# Patient Record
Sex: MALE | Race: WHITE | Employment: FULL TIME | URBAN - METROPOLITAN AREA
[De-identification: names, ages, dates, MRNs, and addresses within clinical notes are randomized per-mention and may not be internally consistent; named-entity substitution may affect disease eponyms.]

---

## 2019-09-19 ENCOUNTER — HOSPITAL ENCOUNTER (OUTPATIENT)
Dept: GENERAL RADIOLOGY | Age: 59
Discharge: HOME | End: 2019-09-19
Payer: COMMERCIAL

## 2019-09-19 ENCOUNTER — OFFICE VISIT (OUTPATIENT)
Dept: FAMILY MEDICINE CLINIC | Age: 59
End: 2019-09-19
Payer: COMMERCIAL

## 2019-09-19 VITALS
HEART RATE: 61 BPM | SYSTOLIC BLOOD PRESSURE: 112 MMHG | HEIGHT: 71 IN | WEIGHT: 177.8 LBS | DIASTOLIC BLOOD PRESSURE: 78 MMHG | BODY MASS INDEX: 24.89 KG/M2 | RESPIRATION RATE: 16 BRPM | OXYGEN SATURATION: 98 %

## 2019-09-19 DIAGNOSIS — M25.512 CHRONIC LEFT SHOULDER PAIN: ICD-10-CM

## 2019-09-19 DIAGNOSIS — E78.00 PURE HYPERCHOLESTEROLEMIA: ICD-10-CM

## 2019-09-19 DIAGNOSIS — G89.29 CHRONIC LEFT SHOULDER PAIN: ICD-10-CM

## 2019-09-19 DIAGNOSIS — Z11.59 NEED FOR HEPATITIS C SCREENING TEST: ICD-10-CM

## 2019-09-19 DIAGNOSIS — Z00.00 ANNUAL PHYSICAL EXAM: Primary | ICD-10-CM

## 2019-09-19 DIAGNOSIS — R53.83 FATIGUE, UNSPECIFIED TYPE: ICD-10-CM

## 2019-09-19 DIAGNOSIS — Z11.4 SCREENING FOR HIV (HUMAN IMMUNODEFICIENCY VIRUS): ICD-10-CM

## 2019-09-19 DIAGNOSIS — Z12.5 SCREENING FOR PROSTATE CANCER: ICD-10-CM

## 2019-09-19 DIAGNOSIS — D12.4 ADENOMATOUS POLYP OF DESCENDING COLON: ICD-10-CM

## 2019-09-19 LAB
ALBUMIN SERPL-MCNC: 5 G/DL (ref 3.4–5)
ALP BLD-CCNC: 67 U/L (ref 40–129)
ALT SERPL-CCNC: 19 U/L (ref 10–40)
ANION GAP SERPL CALCULATED.3IONS-SCNC: 14 MMOL/L (ref 3–16)
AST SERPL-CCNC: 18 U/L (ref 15–37)
BILIRUB SERPL-MCNC: 0.5 MG/DL (ref 0–1)
BILIRUBIN DIRECT: <0.2 MG/DL (ref 0–0.3)
BILIRUBIN, INDIRECT: NORMAL MG/DL (ref 0–1)
BUN BLDV-MCNC: 17 MG/DL (ref 7–20)
CALCIUM SERPL-MCNC: 9.4 MG/DL (ref 8.3–10.6)
CHLORIDE BLD-SCNC: 100 MMOL/L (ref 99–110)
CHOLESTEROL, TOTAL: 289 MG/DL (ref 0–199)
CO2: 24 MMOL/L (ref 21–32)
CREAT SERPL-MCNC: 0.8 MG/DL (ref 0.9–1.3)
GFR AFRICAN AMERICAN: >60
GFR NON-AFRICAN AMERICAN: >60
GLUCOSE BLD-MCNC: 113 MG/DL (ref 70–99)
HCT VFR BLD CALC: 44 % (ref 40.5–52.5)
HDLC SERPL-MCNC: 85 MG/DL (ref 40–60)
HEMOGLOBIN: 14.9 G/DL (ref 13.5–17.5)
HEPATITIS C ANTIBODY INTERPRETATION: NORMAL
LDL CHOLESTEROL CALCULATED: 178 MG/DL
MCH RBC QN AUTO: 30.6 PG (ref 26–34)
MCHC RBC AUTO-ENTMCNC: 33.7 G/DL (ref 31–36)
MCV RBC AUTO: 90.8 FL (ref 80–100)
PDW BLD-RTO: 13.9 % (ref 12.4–15.4)
PHOSPHORUS: 3 MG/DL (ref 2.5–4.9)
PLATELET # BLD: 232 K/UL (ref 135–450)
PMV BLD AUTO: 8.3 FL (ref 5–10.5)
POTASSIUM SERPL-SCNC: 4.2 MMOL/L (ref 3.5–5.1)
PROSTATE SPECIFIC ANTIGEN: 0.97 NG/ML (ref 0–4)
RBC # BLD: 4.85 M/UL (ref 4.2–5.9)
SODIUM BLD-SCNC: 138 MMOL/L (ref 136–145)
TOTAL PROTEIN: 7.2 G/DL (ref 6.4–8.2)
TRIGL SERPL-MCNC: 130 MG/DL (ref 0–150)
TSH SERPL DL<=0.05 MIU/L-ACNC: 1.17 UIU/ML (ref 0.27–4.2)
VLDLC SERPL CALC-MCNC: 26 MG/DL
WBC # BLD: 5.3 K/UL (ref 4–11)

## 2019-09-19 PROCEDURE — 36415 COLL VENOUS BLD VENIPUNCTURE: CPT | Performed by: INTERNAL MEDICINE

## 2019-09-19 PROCEDURE — 73030 X-RAY EXAM OF SHOULDER: CPT

## 2019-09-19 PROCEDURE — 99396 PREV VISIT EST AGE 40-64: CPT | Performed by: INTERNAL MEDICINE

## 2019-09-19 ASSESSMENT — PATIENT HEALTH QUESTIONNAIRE - PHQ9
2. FEELING DOWN, DEPRESSED OR HOPELESS: 0
SUM OF ALL RESPONSES TO PHQ QUESTIONS 1-9: 0
SUM OF ALL RESPONSES TO PHQ QUESTIONS 1-9: 0
1. LITTLE INTEREST OR PLEASURE IN DOING THINGS: 0
SUM OF ALL RESPONSES TO PHQ9 QUESTIONS 1 & 2: 0

## 2019-09-19 ASSESSMENT — ENCOUNTER SYMPTOMS
EYES NEGATIVE: 1
RESPIRATORY NEGATIVE: 1
GASTROINTESTINAL NEGATIVE: 1
ALLERGIC/IMMUNOLOGIC NEGATIVE: 1

## 2019-09-19 NOTE — PROGRESS NOTES
Subjective:      Patient ID: Ying Loomis is a 64 y.o. male. HPI  Annual physical exam  Prostate: today  Colonoscopy: 3/2016. rechx 5 yrs. DEXA: na  Eye: 2016. due  Hearing: passed in office exam  Immunization: flu shot in Oct/Nov.   MMSE: na  Get Up and Go: na  Tob: nonsmoker/ quit  pk yr hx. ETOH: none  Caffeine: moderate am  Cardiac Risk Assessment: labs pending  Living will: no  Medical power of : no    Adenomatous polyp of descending colon  Last colonoscopy was in . No new c/o from pt at present. rechx . Pure hypercholesterolemia  Wt stable but Cholesterol way up. Diet poor per pt. Past Medical History:   Diagnosis Date    Brachial neuritis or radiculitis NOS 2010    Displacement of cervical intervertebral disc without myelopathy 2010    Other and unspecified hyperlipidemia 2010    Palpitations 2010    Panic disorder without agoraphobia 2010     No current outpatient medications on file. No current facility-administered medications for this visit. Allergies   Allergen Reactions    Other      Generic allergy pill     Social History     Tobacco Use    Smoking status: Former Smoker     Packs/day: 0.25     Years: 5.00     Pack years: 1.25     Types: Cigarettes     Last attempt to quit: 1996     Years since quittin.7    Smokeless tobacco: Never Used   Substance Use Topics    Alcohol use: No     Family History   Problem Relation Age of Onset    Diabetes Mother     High Blood Pressure Mother        Review of Systems   Constitutional: Negative. HENT: Negative. Eyes: Negative. Respiratory: Negative. Cardiovascular: Negative. Gastrointestinal: Negative. Endocrine: Negative. Genitourinary: Negative. Musculoskeletal: Negative. Skin: Negative. Allergic/Immunologic: Negative. Neurological: Negative. Hematological: Negative. Psychiatric/Behavioral: Negative.       Vitals:    19 1137 19 murmur heard. Pulses:       Carotid pulses are 2+ on the right side, and 2+ on the left side. Radial pulses are 2+ on the right side, and 2+ on the left side. Femoral pulses are 2+ on the right side, and 2+ on the left side. Popliteal pulses are 2+ on the right side, and 2+ on the left side. Dorsalis pedis pulses are 2+ on the right side, and 2+ on the left side. Posterior tibial pulses are 2+ on the right side, and 2+ on the left side. Pulmonary/Chest: Effort normal and breath sounds normal. No accessory muscle usage or stridor. No apnea, no tachypnea and no bradypnea. No respiratory distress. He has no decreased breath sounds. He has no wheezes. He has no rhonchi. He has no rales. He exhibits no tenderness. Abdominal: Soft. Normal appearance, normal aorta and bowel sounds are normal. He exhibits no shifting dullness, no distension, no pulsatile liver, no fluid wave, no abdominal bruit, no ascites, no pulsatile midline mass and no mass. There is no hepatosplenomegaly. There is no tenderness. There is no rebound, no guarding and no CVA tenderness. No hernia. Hernia confirmed negative in the ventral area, confirmed negative in the right inguinal area and confirmed negative in the left inguinal area. Genitourinary: Rectum normal, prostate normal, testes normal and penis normal. Rectal exam shows guaiac negative stool. Cremasteric reflex is present. No penile tenderness. Musculoskeletal: Normal range of motion. He exhibits tenderness (decreased ROM w/ pain both shoulders. L>>R). He exhibits no edema. Lymphadenopathy:        Head (right side): No submental, no submandibular, no tonsillar, no preauricular, no posterior auricular and no occipital adenopathy present. Head (left side): No submental, no submandibular, no tonsillar, no preauricular, no posterior auricular and no occipital adenopathy present. He has no cervical adenopathy. He has no axillary adenopathy.

## 2019-09-19 NOTE — PATIENT INSTRUCTIONS
All above problems reviewed and the found to be unchanged except for the following :     Annual Physical Exam. Flu shot in Oct.Nov. And Rx for Shingrix. Pure Hypercholesterolemia. Will do labs and will call if need further tx. Adenomatous Polyp of Descending Colon. Call if new c/o. rechx due 2021. L Shoulder pain. Will do X-ray. May need MRI. Probable OA. Prostate: today  Colonoscopy: 3/2016. rechx 5 yrs. DEXA: na  Eye: 11/2016. due  Hearing: passed in office exam  Immunization: flu shot in Oct/Nov.   MMSE: na  Get Up and Go: na  Tob: nonsmoker/ quit 1999/ 8 pk yr hx.   ETOH: none  Caffeine: moderate am  Cardiac Risk Assessment: labs pending  Living will: no  Medical power of : no

## 2019-09-20 ENCOUNTER — TELEPHONE (OUTPATIENT)
Dept: FAMILY MEDICINE CLINIC | Age: 59
End: 2019-09-20

## 2019-09-20 DIAGNOSIS — G89.29 CHRONIC LEFT SHOULDER PAIN: Primary | ICD-10-CM

## 2019-09-20 DIAGNOSIS — M25.512 CHRONIC LEFT SHOULDER PAIN: Primary | ICD-10-CM

## 2019-09-20 LAB
HIV AG/AB: NORMAL
HIV ANTIGEN: NORMAL
HIV-1 ANTIBODY: NORMAL
HIV-2 AB: NORMAL

## 2019-09-30 ENCOUNTER — TELEPHONE (OUTPATIENT)
Dept: FAMILY MEDICINE CLINIC | Age: 59
End: 2019-09-30

## 2019-09-30 DIAGNOSIS — M25.519 SHOULDER PAIN, UNSPECIFIED CHRONICITY, UNSPECIFIED LATERALITY: Primary | ICD-10-CM

## 2019-10-03 ENCOUNTER — TELEPHONE (OUTPATIENT)
Dept: FAMILY MEDICINE CLINIC | Age: 59
End: 2019-10-03

## 2019-10-03 DIAGNOSIS — E78.00 PURE HYPERCHOLESTEROLEMIA: Primary | ICD-10-CM

## 2019-10-03 RX ORDER — ATORVASTATIN CALCIUM 20 MG/1
20 TABLET, FILM COATED ORAL DAILY
Qty: 90 TABLET | Refills: 1 | Status: SHIPPED | OUTPATIENT
Start: 2019-10-03 | End: 2019-10-14 | Stop reason: SDUPTHER

## 2019-10-14 ENCOUNTER — TELEPHONE (OUTPATIENT)
Dept: FAMILY MEDICINE CLINIC | Age: 59
End: 2019-10-14

## 2019-10-14 RX ORDER — ATORVASTATIN CALCIUM 20 MG/1
20 TABLET, FILM COATED ORAL DAILY
Qty: 90 TABLET | Refills: 1 | Status: SHIPPED | OUTPATIENT
Start: 2019-10-14 | End: 2020-10-06 | Stop reason: SDUPTHER

## 2019-10-21 ENCOUNTER — TELEPHONE (OUTPATIENT)
Dept: FAMILY MEDICINE CLINIC | Age: 59
End: 2019-10-21

## 2019-12-02 ENCOUNTER — OFFICE VISIT (OUTPATIENT)
Dept: FAMILY MEDICINE CLINIC | Age: 59
End: 2019-12-02
Payer: COMMERCIAL

## 2019-12-02 VITALS
WEIGHT: 180 LBS | OXYGEN SATURATION: 98 % | BODY MASS INDEX: 25.2 KG/M2 | SYSTOLIC BLOOD PRESSURE: 126 MMHG | DIASTOLIC BLOOD PRESSURE: 68 MMHG | HEIGHT: 71 IN | HEART RATE: 68 BPM

## 2019-12-02 DIAGNOSIS — Z01.818 PREOP EXAMINATION: Primary | ICD-10-CM

## 2019-12-02 DIAGNOSIS — M25.519 SHOULDER PAIN, UNSPECIFIED CHRONICITY, UNSPECIFIED LATERALITY: ICD-10-CM

## 2019-12-02 PROCEDURE — 93000 ELECTROCARDIOGRAM COMPLETE: CPT | Performed by: PHYSICIAN ASSISTANT

## 2019-12-02 PROCEDURE — 99242 OFF/OP CONSLTJ NEW/EST SF 20: CPT | Performed by: PHYSICIAN ASSISTANT

## 2020-10-06 ENCOUNTER — OFFICE VISIT (OUTPATIENT)
Dept: FAMILY MEDICINE CLINIC | Age: 60
End: 2020-10-06
Payer: COMMERCIAL

## 2020-10-06 VITALS
RESPIRATION RATE: 18 BRPM | WEIGHT: 174.2 LBS | OXYGEN SATURATION: 97 % | DIASTOLIC BLOOD PRESSURE: 66 MMHG | SYSTOLIC BLOOD PRESSURE: 124 MMHG | TEMPERATURE: 98 F | HEART RATE: 71 BPM | HEIGHT: 71 IN | BODY MASS INDEX: 24.39 KG/M2

## 2020-10-06 PROBLEM — R07.89 LEFT-SIDED CHEST WALL PAIN: Status: ACTIVE | Noted: 2020-10-06

## 2020-10-06 PROCEDURE — 99213 OFFICE O/P EST LOW 20 MIN: CPT | Performed by: INTERNAL MEDICINE

## 2020-10-06 RX ORDER — ATORVASTATIN CALCIUM 20 MG/1
20 TABLET, FILM COATED ORAL DAILY
Qty: 90 TABLET | Refills: 1 | Status: SHIPPED | OUTPATIENT
Start: 2020-10-06 | End: 2021-07-26

## 2020-10-06 ASSESSMENT — PATIENT HEALTH QUESTIONNAIRE - PHQ9
SUM OF ALL RESPONSES TO PHQ QUESTIONS 1-9: 0
1. LITTLE INTEREST OR PLEASURE IN DOING THINGS: 0
2. FEELING DOWN, DEPRESSED OR HOPELESS: 0
SUM OF ALL RESPONSES TO PHQ QUESTIONS 1-9: 0
SUM OF ALL RESPONSES TO PHQ9 QUESTIONS 1 & 2: 0

## 2020-10-06 ASSESSMENT — ENCOUNTER SYMPTOMS
GASTROINTESTINAL NEGATIVE: 1
RESPIRATORY NEGATIVE: 1
ALLERGIC/IMMUNOLOGIC NEGATIVE: 1

## 2020-10-06 NOTE — PROGRESS NOTES
10/6/2020    Siobhan Abraham (:  1960) is a 64 y.o. male, here for evaluation of the following medical concerns:    HPI  Left-sided chest wall pain  Palp pain axillary line L side after doing ceiling work. Started 3 days ago. No other red flags. Review of Systems   Constitutional: Negative. HENT: Negative. Respiratory: Negative. Cardiovascular: Negative. Gastrointestinal: Negative. Endocrine: Negative. Genitourinary: Negative. Musculoskeletal: Positive for myalgias. Skin: Negative. Allergic/Immunologic: Negative. Neurological: Negative. Hematological: Negative. Psychiatric/Behavioral: Negative. Prior to Visit Medications    Medication Sig Taking?  Authorizing Provider   atorvastatin (LIPITOR) 20 MG tablet Take 1 tablet by mouth daily Yes C Jesús Good MD        Allergies   Allergen Reactions    Other      Generic allergy pill    Percocet [Oxycodone-Acetaminophen] Hives       Past Medical History:   Diagnosis Date    Brachial neuritis or radiculitis NOS 2010    Displacement of cervical intervertebral disc without myelopathy 2010    Other and unspecified hyperlipidemia 2010    Palpitations 2010    Panic disorder without agoraphobia 2010       Past Surgical History:   Procedure Laterality Date    TONSILLECTOMY AND ADENOIDECTOMY         Social History     Socioeconomic History    Marital status:      Spouse name: Not on file    Number of children: Not on file    Years of education: Not on file    Highest education level: Not on file   Occupational History    Not on file   Social Needs    Financial resource strain: Not on file    Food insecurity     Worry: Not on file     Inability: Not on file    Transportation needs     Medical: Not on file     Non-medical: Not on file   Tobacco Use    Smoking status: Former Smoker     Packs/day: 0.25     Years: 5.00     Pack years: 1.25     Types: Cigarettes     Last attempt to quit: 1996     Years since quittin.7    Smokeless tobacco: Never Used   Substance and Sexual Activity    Alcohol use: No    Drug use: No    Sexual activity: Not on file   Lifestyle    Physical activity     Days per week: Not on file     Minutes per session: Not on file    Stress: Not on file   Relationships    Social connections     Talks on phone: Not on file     Gets together: Not on file     Attends Quaker service: Not on file     Active member of club or organization: Not on file     Attends meetings of clubs or organizations: Not on file     Relationship status: Not on file    Intimate partner violence     Fear of current or ex partner: Not on file     Emotionally abused: Not on file     Physically abused: Not on file     Forced sexual activity: Not on file   Other Topics Concern    Not on file   Social History Narrative    Not on file        Family History   Problem Relation Age of Onset    Diabetes Mother     High Blood Pressure Mother        Vitals:    10/06/20 1531   BP: 124/66   Site: Left Upper Arm   Position: Sitting   Cuff Size: Medium Adult   Pulse: 71   Resp: 18   Temp: 98 °F (36.7 °C)   TempSrc: Temporal   SpO2: 97%   Weight: 174 lb 3.2 oz (79 kg)   Height: 5' 11\" (1.803 m)     Estimated body mass index is 24.3 kg/m² as calculated from the following:    Height as of this encounter: 5' 11\" (1.803 m). Weight as of this encounter: 174 lb 3.2 oz (79 kg). Physical Exam  Constitutional:       General: He is not in acute distress. Appearance: Normal appearance. He is well-developed. He is not diaphoretic. HENT:      Head: Normocephalic and atraumatic. Neck:      Musculoskeletal: Full passive range of motion without pain, normal range of motion and neck supple. Thyroid: No thyroid mass or thyromegaly. Vascular: Normal carotid pulses. No carotid bruit, hepatojugular reflux or JVD.       Trachea: Trachea and phonation normal.   Cardiovascular:      Rate and Rhythm: Normal rate and regular rhythm. No extrasystoles are present. Chest Wall: PMI is not displaced. Pulses: Normal pulses. No decreased pulses. Carotid pulses are 2+ on the right side and 2+ on the left side. Radial pulses are 2+ on the right side and 2+ on the left side. Femoral pulses are 2+ on the right side and 2+ on the left side. Popliteal pulses are 2+ on the right side and 2+ on the left side. Dorsalis pedis pulses are 2+ on the right side and 2+ on the left side. Posterior tibial pulses are 2+ on the right side and 2+ on the left side. Heart sounds: Normal heart sounds. No murmur. No friction rub. No gallop. Pulmonary:      Effort: Pulmonary effort is normal. No tachypnea, bradypnea, accessory muscle usage or respiratory distress. Breath sounds: Normal breath sounds. No stridor. No decreased breath sounds, wheezing, rhonchi or rales. Comments: Palp pain L axillary line just superior to 6th rib. Slight increase pain w/ deep breath but no other findings on Chest exam.  Chest:      Chest wall: No tenderness. Abdominal:      Hernia: No hernia is present. There is no hernia in the ventral area. Musculoskeletal:         General: Tenderness (see picture.) present. Arms:    Skin:     General: Skin is warm and dry. Capillary Refill: Capillary refill takes less than 2 seconds. Coloration: Skin is not jaundiced or pale. Findings: No abrasion, bruising, erythema, lesion or rash. Nails: There is no clubbing. Neurological:      General: No focal deficit present. Mental Status: He is alert and oriented to person, place, and time. He is not disoriented. Cranial Nerves: No cranial nerve deficit. Sensory: No sensory deficit. Motor: No weakness, tremor, atrophy or abnormal muscle tone.       Coordination: Coordination normal.      Gait: Gait normal.   Psychiatric:         Speech: Speech normal. Behavior: Behavior normal.         Thought Content: Thought content normal.         Judgment: Judgment normal.         ASSESSMENT/PLAN:  1. Left-sided chest wall pain  Pulled intercostal muscle. Ice and Aleve. Call if new c/o. RTSM in 6 mo for H&P    An  electronic signature was used to authenticate this note.     --Tano Wise MD on 10/6/2020 at 3:52 PM

## 2020-10-06 NOTE — PATIENT INSTRUCTIONS
ASSESSMENT/PLAN:  1. Left-sided chest wall pain  Pulled intercostal muscle. Ice and Aleve. Call if new c/o. RTSM in 6 mo for H&P    An  electronic signature was used to authenticate this note.     --Kimberly Barker MD on 10/6/2020 at 3:52 PM

## 2021-11-16 ENCOUNTER — OFFICE VISIT (OUTPATIENT)
Dept: FAMILY MEDICINE CLINIC | Age: 61
End: 2021-11-16
Payer: COMMERCIAL

## 2021-11-16 VITALS
BODY MASS INDEX: 24.91 KG/M2 | TEMPERATURE: 97.4 F | WEIGHT: 174 LBS | DIASTOLIC BLOOD PRESSURE: 70 MMHG | SYSTOLIC BLOOD PRESSURE: 112 MMHG | HEIGHT: 70 IN | RESPIRATION RATE: 16 BRPM | HEART RATE: 69 BPM | OXYGEN SATURATION: 93 %

## 2021-11-16 DIAGNOSIS — H91.93 BILATERAL HEARING LOSS, UNSPECIFIED HEARING LOSS TYPE: ICD-10-CM

## 2021-11-16 DIAGNOSIS — Z12.5 PROSTATE CANCER SCREENING: ICD-10-CM

## 2021-11-16 DIAGNOSIS — E78.00 PURE HYPERCHOLESTEROLEMIA: ICD-10-CM

## 2021-11-16 DIAGNOSIS — Z23 NEEDS FLU SHOT: Primary | ICD-10-CM

## 2021-11-16 PROCEDURE — 99204 OFFICE O/P NEW MOD 45 MIN: CPT | Performed by: FAMILY MEDICINE

## 2021-11-16 PROCEDURE — 90674 CCIIV4 VAC NO PRSV 0.5 ML IM: CPT | Performed by: FAMILY MEDICINE

## 2021-11-16 PROCEDURE — G8420 CALC BMI NORM PARAMETERS: HCPCS | Performed by: FAMILY MEDICINE

## 2021-11-16 PROCEDURE — G8482 FLU IMMUNIZE ORDER/ADMIN: HCPCS | Performed by: FAMILY MEDICINE

## 2021-11-16 PROCEDURE — 90471 IMMUNIZATION ADMIN: CPT | Performed by: FAMILY MEDICINE

## 2021-11-16 PROCEDURE — 1036F TOBACCO NON-USER: CPT | Performed by: FAMILY MEDICINE

## 2021-11-16 PROCEDURE — G8427 DOCREV CUR MEDS BY ELIG CLIN: HCPCS | Performed by: FAMILY MEDICINE

## 2021-11-16 PROCEDURE — 3017F COLORECTAL CA SCREEN DOC REV: CPT | Performed by: FAMILY MEDICINE

## 2021-11-16 RX ORDER — ATORVASTATIN CALCIUM 20 MG/1
TABLET, FILM COATED ORAL
Qty: 90 TABLET | Refills: 0 | Status: SHIPPED | OUTPATIENT
Start: 2021-11-16 | End: 2022-06-27

## 2021-11-16 SDOH — ECONOMIC STABILITY: TRANSPORTATION INSECURITY
IN THE PAST 12 MONTHS, HAS THE LACK OF TRANSPORTATION KEPT YOU FROM MEDICAL APPOINTMENTS OR FROM GETTING MEDICATIONS?: NO

## 2021-11-16 SDOH — ECONOMIC STABILITY: HOUSING INSECURITY
IN THE LAST 12 MONTHS, WAS THERE A TIME WHEN YOU DID NOT HAVE A STEADY PLACE TO SLEEP OR SLEPT IN A SHELTER (INCLUDING NOW)?: NO

## 2021-11-16 SDOH — ECONOMIC STABILITY: TRANSPORTATION INSECURITY
IN THE PAST 12 MONTHS, HAS LACK OF TRANSPORTATION KEPT YOU FROM MEETINGS, WORK, OR FROM GETTING THINGS NEEDED FOR DAILY LIVING?: NO

## 2021-11-16 SDOH — ECONOMIC STABILITY: FOOD INSECURITY: WITHIN THE PAST 12 MONTHS, YOU WORRIED THAT YOUR FOOD WOULD RUN OUT BEFORE YOU GOT MONEY TO BUY MORE.: NEVER TRUE

## 2021-11-16 SDOH — ECONOMIC STABILITY: FOOD INSECURITY: WITHIN THE PAST 12 MONTHS, THE FOOD YOU BOUGHT JUST DIDN'T LAST AND YOU DIDN'T HAVE MONEY TO GET MORE.: NEVER TRUE

## 2021-11-16 SDOH — ECONOMIC STABILITY: INCOME INSECURITY: IN THE LAST 12 MONTHS, WAS THERE A TIME WHEN YOU WERE NOT ABLE TO PAY THE MORTGAGE OR RENT ON TIME?: NO

## 2021-11-16 ASSESSMENT — PATIENT HEALTH QUESTIONNAIRE - PHQ9
SUM OF ALL RESPONSES TO PHQ9 QUESTIONS 1 & 2: 0
1. LITTLE INTEREST OR PLEASURE IN DOING THINGS: 0
SUM OF ALL RESPONSES TO PHQ QUESTIONS 1-9: 0
2. FEELING DOWN, DEPRESSED OR HOPELESS: 0

## 2021-11-16 ASSESSMENT — SOCIAL DETERMINANTS OF HEALTH (SDOH): HOW HARD IS IT FOR YOU TO PAY FOR THE VERY BASICS LIKE FOOD, HOUSING, MEDICAL CARE, AND HEATING?: NOT HARD AT ALL

## 2021-11-16 NOTE — PROGRESS NOTES
2021    This is a 63 y.o. male   Chief Complaint   Patient presents with   Tello Comer Established New Doctor    Cerumen Impaction     possible   . HPI  Pt presents today as a new pt to establish a PCP. He is a former patient of Dr. Preston Maria and his PMH includes palpitations, panic disorder with agoraphobia, displacement of cervical intervertebral disc and pure hypercholesterolemia. Currently taking Lipitor 20 mg. Denies fatigue, admits to hx of impacted cerumen. Feels that his hearing has decreased.       Past Medical History:   Diagnosis Date    Brachial neuritis or radiculitis NOS 2010    Displacement of cervical intervertebral disc without myelopathy 2010    Other and unspecified hyperlipidemia 2010    Palpitations 2010    Panic disorder without agoraphobia 2010       Past Surgical History:   Procedure Laterality Date    CERVICAL SPINE SURGERY      c6-7    TONSILLECTOMY AND ADENOIDECTOMY         Social History     Socioeconomic History    Marital status:      Spouse name: Not on file    Number of children: Not on file    Years of education: Not on file    Highest education level: Not on file   Occupational History    Not on file   Tobacco Use    Smoking status: Former Smoker     Packs/day: 0.25     Years: 5.00     Pack years: 1.25     Types: Cigarettes     Quit date: 1996     Years since quittin.8    Smokeless tobacco: Never Used   Vaping Use    Vaping Use: Never used   Substance and Sexual Activity    Alcohol use: Yes     Comment: 12-20 per week    Drug use: No    Sexual activity: Yes     Partners: Female   Other Topics Concern    Not on file   Social History Narrative    Not on file     Social Determinants of Health     Financial Resource Strain: Low Risk     Difficulty of Paying Living Expenses: Not hard at all   Food Insecurity: No Food Insecurity    Worried About [address] in the Last Year: Never true    [facility] in the Last Year: Never true   Transportation Needs: No Transportation Needs    Lack of Transportation (Medical): No    Lack of Transportation (Non-Medical): No   Physical Activity:     Days of Exercise per Week: Not on file    Minutes of Exercise per Session: Not on file   Stress:     Feeling of Stress : Not on file   Social Connections:     Frequency of Communication with Friends and Family: Not on file    Frequency of Social Gatherings with Friends and Family: Not on file    Attends Worship Services: Not on file    Active Member of [employer] or Organizations: Not on file    Attends Club or Organization Meetings: Not on file    Marital Status: Not on file   Intimate Partner Violence:     Fear of Current or Ex-Partner: Not on file    Emotionally Abused: Not on file    Physically Abused: Not on file    Sexually Abused: Not on file   Housing Stability: Unknown    Unable to Pay for Housing in the Last Year: No    Number of Places Lived in the Last Year: Not on file    Unstable Housing in the Last Year: No       Family History   Problem Relation Age of Onset    Diabetes Mother     High Blood Pressure Father     Heart Disease Father     High Cholesterol Father     No Known Problems Sister     No Known Problems Maternal Grandmother     No Known Problems Maternal Grandfather     Other Paternal Grandfather         stomach issue    No Known Problems Sister        Current Outpatient Medications   Medication Sig Dispense Refill    atorvastatin (LIPITOR) 20 MG tablet TAKE 1 TABLET BY MOUTH EVERY DAY 90 tablet 0     No current facility-administered medications for this visit.        Immunization History   Administered Date(s) Administered    Influenza 11/10/2010    Influenza Virus Vaccine 10/27/2014    Tdap (Boostrix, Adacel) 12/02/2014       Allergies   Allergen Reactions    Other      Generic allergy pill    Percocet [Oxycodone-Acetaminophen] Hives       Office Visit on 09/19/2019   Component Date Value Ref Range Status    WBC 09/19/2019 5.3  4.0 - 11.0 K/uL Final    RBC 09/19/2019 4.85  4.20 - 5.90 M/uL Final    Hemoglobin 09/19/2019 14.9  13.5 - 17.5 g/dL Final    Hematocrit 09/19/2019 44.0  40.5 - 52.5 % Final    MCV 09/19/2019 90.8  80.0 - 100.0 fL Final    MCH 09/19/2019 30.6  26.0 - 34.0 pg Final    MCHC 09/19/2019 33.7  31.0 - 36.0 g/dL Final    RDW 09/19/2019 13.9  12.4 - 15.4 % Final    Platelets 92/82/5207 232  135 - 450 K/uL Final    MPV 09/19/2019 8.3  5.0 - 10.5 fL Final    Total Protein 09/19/2019 7.2  6.4 - 8.2 g/dL Final    Albumin 09/19/2019 5.0  3.4 - 5.0 g/dL Final    Alkaline Phosphatase 09/19/2019 67  40 - 129 U/L Final    ALT 09/19/2019 19  10 - 40 U/L Final    AST 09/19/2019 18  15 - 37 U/L Final    Total Bilirubin 09/19/2019 0.5  0.0 - 1.0 mg/dL Final    Bilirubin, Direct 09/19/2019 <0.2  0.0 - 0.3 mg/dL Final    Bilirubin, Indirect 09/19/2019 see below  0.0 - 1.0 mg/dL Final    Comment: Indirect Bilirubin cannot be calculated since Total Bilirubin  and/or Direct Bilirubin is below measurable range.  PSA 09/19/2019 0.97  0.00 - 4.00 ng/mL Final    Sodium 09/19/2019 138  136 - 145 mmol/L Final    Potassium 09/19/2019 4.2  3.5 - 5.1 mmol/L Final    Chloride 09/19/2019 100  99 - 110 mmol/L Final    CO2 09/19/2019 24  21 - 32 mmol/L Final    Anion Gap 09/19/2019 14  3 - 16 Final    Glucose 09/19/2019 113* 70 - 99 mg/dL Final    BUN 09/19/2019 17  7 - 20 mg/dL Final    CREATININE 09/19/2019 0.8* 0.9 - 1.3 mg/dL Final    GFR Non- 09/19/2019 >60  >60 Final    Comment: >60 mL/min/1.73m2 EGFR, calc. for ages 25 and older using the  MDRD formula (not corrected for weight), is valid for stable  renal function.  GFR  09/19/2019 >60  >60 Final    Comment: Chronic Kidney Disease: less than 60 ml/min/1.73 sq.m. Kidney Failure: less than 15 ml/min/1.73 sq.m. Results valid for patients 18 years and older.       Calcium 09/19/2019 9.4  8.3 - 10.6 mg/dL Final    Phosphorus 09/19/2019 3.0  2.5 - 4.9 mg/dL Final    TSH 09/19/2019 1.17  0.27 - 4.20 uIU/mL Final    Cholesterol, Total 09/19/2019 289* 0 - 199 mg/dL Final    Triglycerides 09/19/2019 130  0 - 150 mg/dL Final    HDL 09/19/2019 85* 40 - 60 mg/dL Final    LDL Calculated 09/19/2019 178* <100 mg/dL Final    VLDL Cholesterol Calculated 09/19/2019 26  Not Established mg/dL Final    HIV Ag/Ab 09/19/2019 Non-Reactive  Non-reactive Final    HIV-1 Antibody 09/19/2019 Non-Reactive  Non-reactive Final    HIV ANTIGEN 09/19/2019 Non-Reactive  Non-reactive Final    HIV-2 Ab 09/19/2019 Non-Reactive  Non-reactive Final    Hep C Ab Interp 09/19/2019 Non-reactive  Non-reactive Final       Review of Systems   Constitutional: Negative for fatigue. HENT: Positive for hearing loss. Hx of cerumen impacted. /70 (Site: Left Upper Arm, Position: Sitting)   Pulse 69   Temp 97.4 °F (36.3 °C) (Temporal)   Resp 16   Ht 5' 10\" (1.778 m)   Wt 174 lb (78.9 kg)   SpO2 93%   BMI 24.97 kg/m²     Physical Exam  Vitals reviewed. Constitutional:       Appearance: Normal appearance. He is well-developed. HENT:      Head: Normocephalic and atraumatic. Right Ear: Tympanic membrane normal.      Left Ear: Tympanic membrane normal.      Ears:      Comments: Mild bilateral cerumen  Eyes:      Pupils: Pupils are equal, round, and reactive to light. Cardiovascular:      Rate and Rhythm: Normal rate and regular rhythm. Heart sounds: No murmur heard. Pulmonary:      Effort: Pulmonary effort is normal.      Breath sounds: Normal breath sounds. No wheezing. Abdominal:      General: Bowel sounds are normal.      Tenderness: There is no abdominal tenderness. Musculoskeletal:      Cervical back: Normal range of motion. Neurological:      Mental Status: He is alert and oriented to person, place, and time.    Psychiatric:         Behavior: Behavior normal.         Thought

## 2022-03-28 DIAGNOSIS — E78.00 PURE HYPERCHOLESTEROLEMIA: ICD-10-CM

## 2022-03-28 DIAGNOSIS — Z12.5 PROSTATE CANCER SCREENING: ICD-10-CM

## 2022-03-28 LAB
A/G RATIO: 1.9 (ref 1.1–2.2)
ALBUMIN SERPL-MCNC: 4.2 G/DL (ref 3.4–5)
ALP BLD-CCNC: 67 U/L (ref 40–129)
ALT SERPL-CCNC: 17 U/L (ref 10–40)
ANION GAP SERPL CALCULATED.3IONS-SCNC: 15 MMOL/L (ref 3–16)
AST SERPL-CCNC: 18 U/L (ref 15–37)
BASOPHILS ABSOLUTE: 0 K/UL (ref 0–0.2)
BASOPHILS RELATIVE PERCENT: 0.9 %
BILIRUB SERPL-MCNC: 0.6 MG/DL (ref 0–1)
BUN BLDV-MCNC: 19 MG/DL (ref 7–20)
CALCIUM SERPL-MCNC: 8.6 MG/DL (ref 8.3–10.6)
CHLORIDE BLD-SCNC: 106 MMOL/L (ref 99–110)
CHOLESTEROL, TOTAL: 202 MG/DL (ref 0–199)
CO2: 22 MMOL/L (ref 21–32)
CREAT SERPL-MCNC: 1 MG/DL (ref 0.9–1.3)
EOSINOPHILS ABSOLUTE: 0.1 K/UL (ref 0–0.6)
EOSINOPHILS RELATIVE PERCENT: 2 %
GFR AFRICAN AMERICAN: >60
GFR NON-AFRICAN AMERICAN: >60
GLUCOSE BLD-MCNC: 102 MG/DL (ref 70–99)
HCT VFR BLD CALC: 43.8 % (ref 40.5–52.5)
HDLC SERPL-MCNC: 64 MG/DL (ref 40–60)
HEMOGLOBIN: 14.7 G/DL (ref 13.5–17.5)
LDL CHOLESTEROL CALCULATED: 111 MG/DL
LYMPHOCYTES ABSOLUTE: 1.6 K/UL (ref 1–5.1)
LYMPHOCYTES RELATIVE PERCENT: 34.7 %
MCH RBC QN AUTO: 30.7 PG (ref 26–34)
MCHC RBC AUTO-ENTMCNC: 33.6 G/DL (ref 31–36)
MCV RBC AUTO: 91.4 FL (ref 80–100)
MONOCYTES ABSOLUTE: 0.5 K/UL (ref 0–1.3)
MONOCYTES RELATIVE PERCENT: 9.9 %
NEUTROPHILS ABSOLUTE: 2.5 K/UL (ref 1.7–7.7)
NEUTROPHILS RELATIVE PERCENT: 52.5 %
PDW BLD-RTO: 13.6 % (ref 12.4–15.4)
PLATELET # BLD: 218 K/UL (ref 135–450)
PMV BLD AUTO: 8.3 FL (ref 5–10.5)
POTASSIUM SERPL-SCNC: 4.1 MMOL/L (ref 3.5–5.1)
PROSTATE SPECIFIC ANTIGEN: 0.98 NG/ML (ref 0–4)
RBC # BLD: 4.79 M/UL (ref 4.2–5.9)
SODIUM BLD-SCNC: 143 MMOL/L (ref 136–145)
T4 FREE: 1.4 NG/DL (ref 0.9–1.8)
TOTAL PROTEIN: 6.4 G/DL (ref 6.4–8.2)
TRIGL SERPL-MCNC: 136 MG/DL (ref 0–150)
TSH SERPL DL<=0.05 MIU/L-ACNC: 1.45 UIU/ML (ref 0.27–4.2)
VLDLC SERPL CALC-MCNC: 27 MG/DL
WBC # BLD: 4.7 K/UL (ref 4–11)

## 2022-04-26 ENCOUNTER — OFFICE VISIT (OUTPATIENT)
Dept: FAMILY MEDICINE CLINIC | Age: 62
End: 2022-04-26
Payer: COMMERCIAL

## 2022-04-26 VITALS
WEIGHT: 185.4 LBS | HEIGHT: 71 IN | SYSTOLIC BLOOD PRESSURE: 120 MMHG | HEART RATE: 72 BPM | BODY MASS INDEX: 25.96 KG/M2 | RESPIRATION RATE: 16 BRPM | OXYGEN SATURATION: 94 % | TEMPERATURE: 97.7 F | DIASTOLIC BLOOD PRESSURE: 70 MMHG

## 2022-04-26 DIAGNOSIS — M79.645 PAIN IN FINGER OF LEFT HAND: ICD-10-CM

## 2022-04-26 DIAGNOSIS — Z00.00 ANNUAL PHYSICAL EXAM: Primary | ICD-10-CM

## 2022-04-26 DIAGNOSIS — Z23 NEED FOR VACCINATION FOR ZOSTER: ICD-10-CM

## 2022-04-26 DIAGNOSIS — Z23 NEED FOR PROPHYLACTIC VACCINATION AND INOCULATION AGAINST VARICELLA: ICD-10-CM

## 2022-04-26 PROCEDURE — 99396 PREV VISIT EST AGE 40-64: CPT | Performed by: FAMILY MEDICINE

## 2022-04-26 ASSESSMENT — PATIENT HEALTH QUESTIONNAIRE - PHQ9
1. LITTLE INTEREST OR PLEASURE IN DOING THINGS: 0
SUM OF ALL RESPONSES TO PHQ QUESTIONS 1-9: 0
SUM OF ALL RESPONSES TO PHQ9 QUESTIONS 1 & 2: 0
SUM OF ALL RESPONSES TO PHQ QUESTIONS 1-9: 0
2. FEELING DOWN, DEPRESSED OR HOPELESS: 0
SUM OF ALL RESPONSES TO PHQ QUESTIONS 1-9: 0
SUM OF ALL RESPONSES TO PHQ QUESTIONS 1-9: 0

## 2022-04-26 ASSESSMENT — ENCOUNTER SYMPTOMS
COLOR CHANGE: 0
SHORTNESS OF BREATH: 0
ABDOMINAL PAIN: 0
BACK PAIN: 0

## 2022-04-26 NOTE — PROGRESS NOTES
Vangie Closs  YOB: 1960    Date of Service:  4/26/2022    Chief Complaint:   Vangie Closs is a 63 y.o. male who presents for complete physical examination. HPI:  HPI  Labs from March 28, 2022 within normal limits except for glucose 102, total cholesterol 202, and LDL cholesterol 111.     Self-testicular exams: Yes  Sexual activity: has sex with females   Last eye exam: 2 months ago ,normal  Exercise: walks 2 time(s) per week  Seatbelt use: yes  Are You a Spiritual person: yes  Living will: no    Wt Readings from Last 3 Encounters:   04/26/22 185 lb 6.4 oz (84.1 kg)   11/16/21 174 lb (78.9 kg)   10/06/20 174 lb 3.2 oz (79 kg)     BP Readings from Last 3 Encounters:   04/26/22 120/70   11/16/21 112/70   10/06/20 124/66       Patient Active Problem List   Diagnosis    Palpitations    Panic disorder without agoraphobia    Displacement of cervical intervertebral disc without myelopathy    Brachial neuritis or radiculitis    Other and unspecified hyperlipidemia    Shoulder pain    Ear pain    History of colonic polyps    Chronic left hip pain    Change in voice    Pure hypercholesterolemia    Left-sided chest wall pain       Health Maintenance   Topic Date Due    Diabetes screen  Never done    Shingles Vaccine (1 of 2) Never done    COVID-19 Vaccine (3 - Booster for Moderna series) 11/11/2021    Annual Wellness Visit (AWV)  Never done    Depression Screen  11/16/2022    Lipids  03/28/2023    DTaP/Tdap/Td vaccine (2 - Td or Tdap) 12/02/2024    Colorectal Cancer Screen  05/07/2026    Flu vaccine  Completed    Hepatitis C screen  Completed    HIV screen  Completed    Hepatitis A vaccine  Aged Out    Hepatitis B vaccine  Aged Out    Hib vaccine  Aged Out    Meningococcal (ACWY) vaccine  Aged Out    Pneumococcal 0-64 years Vaccine  Aged [employer] History   Administered Date(s) Administered    COVID-19, Moderna, Primary or Immunocompromised, PF, 100mcg/0.5mL 2021, 2021    Influenza 11/10/2010    Influenza Virus Vaccine 10/27/2014    Influenza, MDCK Quadv, IM, PF (Flucelvax 2 yrs and older) 2021    Tdap (Boostrix, Adacel) 2014       Allergies   Allergen Reactions    Other      Generic allergy pill    Percocet [Oxycodone-Acetaminophen] Hives     Outpatient Medications Marked as Taking for the 22 encounter (Office Visit) with Artemio Menon DO   Medication Sig Dispense Refill    zoster recombinant adjuvanted vaccine (SHINGRIX) 50 MCG/0.5ML SUSR injection 50 MCG IM then repeat 2-6 months.  0.5 mL 1       Past Medical History:   Diagnosis Date    Brachial neuritis or radiculitis NOS 2010    Displacement of cervical intervertebral disc without myelopathy 2010    Other and unspecified hyperlipidemia 2010    Palpitations 2010    Panic disorder without agoraphobia 2010     Past Surgical History:   Procedure Laterality Date    CERVICAL SPINE SURGERY      c6-7    SHOULDER SURGERY Left 2020    TONSILLECTOMY AND ADENOIDECTOMY       Family History   Problem Relation Age of Onset    Diabetes Mother     High Blood Pressure Father     Heart Disease Father     High Cholesterol Father     No Known Problems Sister     No Known Problems Maternal Grandmother     No Known Problems Maternal Grandfather     Other Paternal Grandfather         stomach issue    No Known Problems Sister      Social History     Socioeconomic History    Marital status:      Spouse name: Not on file    Number of children: Not on file    Years of education: Not on file    Highest education level: Not on file   Occupational History    Not on file   Tobacco Use    Smoking status: Former Smoker     Packs/day: 0.25     Years: 5.00     Pack years: 1.25     Types: Cigarettes     Quit date: 1996     Years since quittin.3    Smokeless tobacco: Never Used   Vaping Use    Vaping Use: Never used   Substance and Sexual Activity    Alcohol use: Yes     Comment: 12-20 per week    Drug use: No    Sexual activity: Yes     Partners: Female   Other Topics Concern    Not on file   Social History Narrative    Not on file     Social Determinants of Health     Financial Resource Strain: Low Risk     Difficulty of Paying Living Expenses: Not hard at all   Food Insecurity: No Food Insecurity    Worried About [address] in the Last Year: Never true    [address] in the Last Year: Never true   Transportation Needs: No Transportation Needs    Lack of Transportation (Medical): No    Lack of Transportation (Non-Medical): No   Physical Activity:     Days of Exercise per Week: Not on file    Minutes of Exercise per Session: Not on file   Stress:     Feeling of Stress : Not on file   Social Connections:     Frequency of Communication with Friends and Family: Not on file    Frequency of Social Gatherings with Friends and Family: Not on file    Attends [employer] Services: Not on file    Active Member of Clubs or Organizations: Not on file    Attends Club or Organization Meetings: Not on file    Marital Status: Not on file   Intimate Partner Violence:     Fear of Current or Ex-Partner: Not on file    Emotionally Abused: Not on file    Physically Abused: Not on file    Sexually Abused: Not on file   Housing Stability: Unknown    Unable to Pay for Housing in the Last Year: No    Number of [place] in the Last Year: Not on file    Unstable Housing in the Last Year: No       Review ofSystems:  Review of Systems   Constitutional: Positive for diaphoresis. Negative for fatigue. HENT: Negative for congestion. Eyes: Negative for visual disturbance. Respiratory: Negative for shortness of breath. Cardiovascular: Negative for chest pain. Gastrointestinal: Negative for abdominal pain. Endocrine: Positive for cold intolerance. Negative for heat intolerance. Genitourinary: Negative for difficulty urinating. Musculoskeletal: Positive for arthralgias (right knee). Negative for back pain and neck pain. Periodic left index finger difficulty with extension (1 year hx and worsening)   Skin: Negative for color change. Allergic/Immunologic: Negative for environmental allergies. Neurological: Negative for dizziness and headaches. Hematological: Does not bruise/bleed easily. PhysicalExam:   Vitals:    04/26/22 1455   BP: 120/70   Site: Right Lower Arm   Position: Sitting   Cuff Size: Large Adult   Pulse: 72   Resp: 16   Temp: 97.7 °F (36.5 °C)   TempSrc: Temporal   SpO2: 94%   Weight: 185 lb 6.4 oz (84.1 kg)   Height: 5' 11.4\" (1.814 m)     Body mass index is 25.57 kg/m². Physical Exam  Vitals reviewed. Constitutional:       Appearance: Normal appearance. He is well-developed. HENT:      Head: Normocephalic and atraumatic. Right Ear: Tympanic membrane and external ear normal.      Left Ear: Tympanic membrane and external ear normal.      Nose: Nose normal.      Mouth/Throat:      Mouth: Mucous membranes are moist.   Eyes:      Extraocular Movements: Extraocular movements intact. Pupils: Pupils are equal, round, and reactive to light. Cardiovascular:      Rate and Rhythm: Normal rate and regular rhythm. Heart sounds: Normal heart sounds. No murmur heard. Pulmonary:      Effort: Pulmonary effort is normal.      Breath sounds: Normal breath sounds. No wheezing. Abdominal:      General: Bowel sounds are normal.      Palpations: Abdomen is soft. Tenderness: There is no abdominal tenderness. Musculoskeletal:         General: Deformity (right patella and posterior base of skull) present. Normal range of motion. Cervical back: Normal range of motion. No rigidity or tenderness. Comments: Bilateral UE/LE normal ROM, decreased extension of left index finger   Skin:     General: Skin is warm and dry.    Neurological:      Mental Status: He is alert and oriented to person, place, and time. Cranial Nerves: No cranial nerve deficit. Sensory: No sensory deficit. Motor: No weakness. Coordination: Coordination normal.      Gait: Gait normal.      Deep Tendon Reflexes: Reflexes are normal and symmetric. Reflexes normal.   Psychiatric:         Behavior: Behavior normal.         Thought Content: Thought content normal.         Judgment: Judgment normal.         Assessment/Plan:   Diagnosis Orders   1. Annual physical exam     2. Need for vaccination for zoster     3. Need for prophylactic vaccination and inoculation against varicella  zoster recombinant adjuvanted vaccine (SHINGRIX) 50 MCG/0.5ML SUSR injection   4. Pain in finger of left hand  Swati Meza MD, Hand Surgery (Hand, Wrist, Upper Extremity), [facility]     Return in about 1 year (around 4/26/2023) for Physical Exam.    Prior to Visit Medications    Medication Sig Taking? Authorizing Provider   zoster recombinant adjuvanted vaccine (SHINGRIX) 50 MCG/0.5ML SUSR injection 50 MCG IM then repeat 2-6 months.  Yes Ventura Peterson, DO   atorvastatin (LIPITOR) 20 MG tablet TAKE 1 TABLET BY MOUTH EVERY DAY  Ventura Peterson, DO

## 2022-04-27 ENCOUNTER — TELEPHONE (OUTPATIENT)
Dept: FAMILY MEDICINE CLINIC | Age: 62
End: 2022-04-27

## 2022-04-27 DIAGNOSIS — L74.510 HYPERHIDROSIS OF AXILLA: Primary | ICD-10-CM

## 2022-04-27 NOTE — TELEPHONE ENCOUNTER
Pt said no he has not tried the Drysol before. He would like to try it. Please send to jami on file. Please let pt know once it has been sent.

## 2022-04-27 NOTE — TELEPHONE ENCOUNTER
At his physical yesterday pt and I discussed possible prescriptions deodorants for his sweating issue. Please ask if he has tried Aluminum Chloride (Drysol) 20 % solution. Thank you.

## 2022-04-29 ENCOUNTER — OFFICE VISIT (OUTPATIENT)
Dept: ORTHOPEDIC SURGERY | Age: 62
End: 2022-04-29
Payer: COMMERCIAL

## 2022-04-29 VITALS — BODY MASS INDEX: 25.9 KG/M2 | HEIGHT: 71 IN | WEIGHT: 185 LBS

## 2022-04-29 DIAGNOSIS — M79.645 PAIN OF FINGER OF LEFT HAND: Primary | ICD-10-CM

## 2022-04-29 PROCEDURE — 99203 OFFICE O/P NEW LOW 30 MIN: CPT | Performed by: PHYSICIAN ASSISTANT

## 2022-04-29 PROCEDURE — 3017F COLORECTAL CA SCREEN DOC REV: CPT | Performed by: PHYSICIAN ASSISTANT

## 2022-04-29 PROCEDURE — 1036F TOBACCO NON-USER: CPT | Performed by: PHYSICIAN ASSISTANT

## 2022-04-29 PROCEDURE — G8427 DOCREV CUR MEDS BY ELIG CLIN: HCPCS | Performed by: PHYSICIAN ASSISTANT

## 2022-04-29 PROCEDURE — G8419 CALC BMI OUT NRM PARAM NOF/U: HCPCS | Performed by: PHYSICIAN ASSISTANT

## 2022-04-29 NOTE — PROGRESS NOTES
Chief Complaint    Hand Pain (left index finger)      History of Present Illness:  Renee Watts is a 63 y.o. male who presents today for evaluation of left index finger pain. Patient states that he has been experiencing pain within the left index finger for the past several months. He denies any specific trauma. He states that he has an inability at times to fully extend the finger and at times when he makes a fist he has pain over the palmar surface of the index finger at the MCP joint. He denies any previous injuries to the left index finger and he is left-hand dominant. He feels at times of a locking sensation but the finger never truly locks. He has tried over-the-counter medication with minimal benefit. Pain Assessment  Location of Pain: Finger  Location Modifiers: Left  Severity of Pain: 6  Quality of Pain: Other (Comment)  Duration of Pain: Other (Comment)  Frequency of Pain: Other (Comment)    Medical History:  Patient's medications, allergies, past medical, surgical, social and family histories were reviewed and updated as appropriate. Review of Systems:  Pertinent items are noted in HPI  Review of systems reviewed from Patient History Form dated on 4/29/2022 and available in the patient's chart under the Media tab. Vital Signs:  Ht 5' 11.42\" (1.814 m)   Wt 185 lb (83.9 kg)   BMI 25.50 kg/m²     General Exam:   Constitutional: Patient is adequately groomed with no evidence of malnutrition  Mental Status: The patient is oriented to time, place and person. The patient's mood and affect are appropriate. Neurological: The patient has good coordination. There is no weakness or sensory deficit. Hand Examination:    Inspection: Today's inspection of the left index finger reveals the skin to be intact with soft tissue prominence over the palmar surface at the MCP joint. There is no ecchymosis or erythema noted.   There is no obvious deformity noted    Palpation: Patient is exquisitely tender to palpation over the palmar surface of the MCP joint at the index finger and there is a small mass felt. Upon palpation the patient felt improved symptoms with palpation. Range of Motion: Patient has full range of motion of the index finger with minimal pain    Strength: There are no strength deficits noted upon testing and there is no ligamentous instability noted. Special Tests: Capillary refill is within 2 seconds    Skin: There are no rashes, ulcerations or lesions. Distal neuro vas status grossly intact    Radiology:     X-rays obtained and reviewed in office:  Views 3 views of the left index finger include AP, lateral, oblique were obtained today in office and independently reviewed with the patient  Location left index finger  Impression there are no acute or subacute fractures or dislocations noted          Assessment : Possible ganglion cyst that ruptured at the time of the exam  Possible early Dupuytren's contracture    Impression:  Encounter Diagnosis   Name Primary?  Pain of finger of left hand Yes       Office Procedures:  Orders Placed This Encounter   Procedures    XR FINGER LEFT (MIN 2 VIEWS)     Standing Status:   Future     Number of Occurrences:   1     Standing Expiration Date:   4/29/2023       Treatment Plan: Today we discussed the diagnosis and treatment options and since he has improved range of motion with less pain he will follow-up in the future with Dr. Abdias Goldberg for his continue evaluation care.     Belem Jasmine PA-C  Board certified by the [facility]

## 2022-05-06 ENCOUNTER — NURSE ONLY (OUTPATIENT)
Dept: FAMILY MEDICINE CLINIC | Age: 62
End: 2022-05-06
Payer: COMMERCIAL

## 2022-05-06 PROCEDURE — 90471 IMMUNIZATION ADMIN: CPT | Performed by: FAMILY MEDICINE

## 2022-05-06 PROCEDURE — 90750 HZV VACC RECOMBINANT IM: CPT | Performed by: FAMILY MEDICINE

## 2022-06-27 RX ORDER — ATORVASTATIN CALCIUM 20 MG/1
TABLET, FILM COATED ORAL
Qty: 90 TABLET | Refills: 1 | Status: SHIPPED | OUTPATIENT
Start: 2022-06-27

## 2022-11-10 ENCOUNTER — NURSE ONLY (OUTPATIENT)
Dept: FAMILY MEDICINE CLINIC | Age: 62
End: 2022-11-10
Payer: COMMERCIAL

## 2022-11-10 PROCEDURE — 90471 IMMUNIZATION ADMIN: CPT | Performed by: FAMILY MEDICINE

## 2022-11-10 PROCEDURE — 90750 HZV VACC RECOMBINANT IM: CPT | Performed by: FAMILY MEDICINE

## 2022-12-01 ENCOUNTER — OFFICE VISIT (OUTPATIENT)
Dept: FAMILY MEDICINE CLINIC | Age: 62
End: 2022-12-01
Payer: COMMERCIAL

## 2022-12-01 VITALS
WEIGHT: 175.2 LBS | TEMPERATURE: 97.5 F | SYSTOLIC BLOOD PRESSURE: 118 MMHG | DIASTOLIC BLOOD PRESSURE: 78 MMHG | RESPIRATION RATE: 16 BRPM | BODY MASS INDEX: 24.15 KG/M2 | OXYGEN SATURATION: 96 % | HEART RATE: 69 BPM

## 2022-12-01 DIAGNOSIS — Z00.00 ANNUAL PHYSICAL EXAM: Primary | ICD-10-CM

## 2022-12-01 DIAGNOSIS — M25.551 RIGHT HIP PAIN: ICD-10-CM

## 2022-12-01 DIAGNOSIS — Z12.5 PROSTATE CANCER SCREENING: ICD-10-CM

## 2022-12-01 DIAGNOSIS — E78.00 PURE HYPERCHOLESTEROLEMIA: ICD-10-CM

## 2022-12-01 DIAGNOSIS — M70.61 TROCHANTERIC BURSITIS OF RIGHT HIP: ICD-10-CM

## 2022-12-01 PROCEDURE — 99213 OFFICE O/P EST LOW 20 MIN: CPT | Performed by: FAMILY MEDICINE

## 2022-12-01 PROCEDURE — 3017F COLORECTAL CA SCREEN DOC REV: CPT | Performed by: FAMILY MEDICINE

## 2022-12-01 PROCEDURE — G8427 DOCREV CUR MEDS BY ELIG CLIN: HCPCS | Performed by: FAMILY MEDICINE

## 2022-12-01 PROCEDURE — G8420 CALC BMI NORM PARAMETERS: HCPCS | Performed by: FAMILY MEDICINE

## 2022-12-01 PROCEDURE — G8484 FLU IMMUNIZE NO ADMIN: HCPCS | Performed by: FAMILY MEDICINE

## 2022-12-01 PROCEDURE — 1036F TOBACCO NON-USER: CPT | Performed by: FAMILY MEDICINE

## 2022-12-01 RX ORDER — MELOXICAM 15 MG/1
15 TABLET ORAL DAILY
Qty: 7 TABLET | Refills: 0 | Status: SHIPPED | OUTPATIENT
Start: 2022-12-01

## 2022-12-01 RX ORDER — METHYLPREDNISOLONE 4 MG/1
TABLET ORAL
Qty: 1 KIT | Refills: 0 | Status: SHIPPED | OUTPATIENT
Start: 2022-12-01 | End: 2022-12-07

## 2022-12-01 SDOH — ECONOMIC STABILITY: FOOD INSECURITY: WITHIN THE PAST 12 MONTHS, THE FOOD YOU BOUGHT JUST DIDN'T LAST AND YOU DIDN'T HAVE MONEY TO GET MORE.: NEVER TRUE

## 2022-12-01 SDOH — ECONOMIC STABILITY: FOOD INSECURITY: WITHIN THE PAST 12 MONTHS, YOU WORRIED THAT YOUR FOOD WOULD RUN OUT BEFORE YOU GOT MONEY TO BUY MORE.: NEVER TRUE

## 2022-12-01 ASSESSMENT — PATIENT HEALTH QUESTIONNAIRE - PHQ9
2. FEELING DOWN, DEPRESSED OR HOPELESS: 0
SUM OF ALL RESPONSES TO PHQ QUESTIONS 1-9: 0
SUM OF ALL RESPONSES TO PHQ9 QUESTIONS 1 & 2: 0
1. LITTLE INTEREST OR PLEASURE IN DOING THINGS: 0
SUM OF ALL RESPONSES TO PHQ QUESTIONS 1-9: 0

## 2022-12-01 ASSESSMENT — SOCIAL DETERMINANTS OF HEALTH (SDOH): HOW HARD IS IT FOR YOU TO PAY FOR THE VERY BASICS LIKE FOOD, HOUSING, MEDICAL CARE, AND HEATING?: NOT HARD AT ALL

## 2022-12-01 NOTE — PROGRESS NOTES
2022    This is a 63 y.o. male   Chief Complaint   Patient presents with    Joint Pain     R hip pain    . HPI  Pt presents for right hip pain. Sx began 2 months ago. Onset ws gradual, denies triggering event, difficult to sit at chair, denies previous episode, pain originates qt right lateral trochanter, radiates tonight buttock and down through.     Past Medical History:   Diagnosis Date    Brachial neuritis or radiculitis NOS 2010    Displacement of cervical intervertebral disc without myelopathy 2010    Other and unspecified hyperlipidemia 2010    Palpitations 2010    Panic disorder without agoraphobia 2010       Past Surgical History:   Procedure Laterality Date    CERVICAL SPINE SURGERY      c6-7    SHOULDER SURGERY Left 2020    TONSILLECTOMY AND ADENOIDECTOMY         Social History     Socioeconomic History    Marital status:      Spouse name: Not on file    Number of children: Not on file    Years of education: Not on file    Highest education level: Not on file   Occupational History    Not on file   Tobacco Use    Smoking status: Former     Packs/day: 0.25     Years: 5.00     Pack years: 1.25     Types: Cigarettes     Quit date: 1996     Years since quittin.9    Smokeless tobacco: Never   Vaping Use    Vaping Use: Never used   Substance and Sexual Activity    Alcohol use: Yes     Comment: 12-20 per week    Drug use: No    Sexual activity: Yes     Partners: Female   Other Topics Concern    Not on file   Social History Narrative    Not on file     Social Determinants of Health     Financial Resource Strain: Low Risk     Difficulty of Paying Living Expenses: Not hard at all   Food Insecurity: No Food Insecurity    Worried About Running Out of Food in the Last Year: Never true    Ran Out of Food in the Last Year: Never true   Transportation Needs: Not on file   Physical Activity: Not on file   Stress: Not on file   Social Connections: Not on file Intimate Partner Violence: Not on file   Housing Stability: Not on file       Family History   Problem Relation Age of Onset    Diabetes Mother     High Blood Pressure Father     Heart Disease Father     High Cholesterol Father     No Known Problems Sister     No Known Problems Maternal Grandmother     No Known Problems Maternal Grandfather     Other Paternal Grandfather         stomach issue    No Known Problems Sister        Current Outpatient Medications   Medication Sig Dispense Refill    methylPREDNISolone (MEDROL DOSEPACK) 4 MG tablet Take by mouth. 1 kit 0    diclofenac sodium (VOLTAREN) 1 % GEL Apply 2 g topically 4 times daily 24 g 1    meloxicam (MOBIC) 15 MG tablet Take 1 tablet by mouth daily 7 tablet 0    atorvastatin (LIPITOR) 20 MG tablet TAKE 1 TABLET BY MOUTH EVERY DAY 90 tablet 1    aluminum chloride (DRYSOL) 20 % external solution Apply topically nightly. 1 each 3     No current facility-administered medications for this visit.        Immunization History   Administered Date(s) Administered    COVID-19, MODERNA BLUE border, Primary or Immunocompromised, (age 12y+), IM, 100 mcg/0.5mL 05/14/2021, 06/11/2021, 12/14/2021    Influenza 11/10/2010    Influenza Virus Vaccine 10/27/2014    Influenza, FLUCELVAX, (age 10 mo+), MDCK, PF, 0.5mL 11/16/2021    Tdap (Boostrix, Adacel) 12/02/2014    Zoster Recombinant (Shingrix) 05/06/2022, 11/10/2022       Allergies   Allergen Reactions    Other      Generic allergy pill    Percocet [Oxycodone-Acetaminophen] Hives       Orders Only on 03/28/2022   Component Date Value Ref Range Status    PSA 03/28/2022 0.98  0.00 - 4.00 ng/mL Final    Sodium 03/28/2022 143  136 - 145 mmol/L Final    Potassium 03/28/2022 4.1  3.5 - 5.1 mmol/L Final    Chloride 03/28/2022 106  99 - 110 mmol/L Final    CO2 03/28/2022 22  21 - 32 mmol/L Final    Anion Gap 03/28/2022 15  3 - 16 Final    Glucose 03/28/2022 102 (A)  70 - 99 mg/dL Final    BUN 03/28/2022 19  7 - 20 mg/dL Final Creatinine 03/28/2022 1.0  0.9 - 1.3 mg/dL Final    GFR Non- 03/28/2022 >60  >60 Final    Comment: >60 mL/min/1.73m2 EGFR, calc. for ages 25 and older using the  MDRD formula (not corrected for weight), is valid for stable  renal function. GFR  03/28/2022 >60  >60 Final    Comment: Chronic Kidney Disease: less than 60 ml/min/1.73 sq.m. Kidney Failure: less than 15 ml/min/1.73 sq.m. Results valid for patients 18 years and older.       Calcium 03/28/2022 8.6  8.3 - 10.6 mg/dL Final    Total Protein 03/28/2022 6.4  6.4 - 8.2 g/dL Final    Albumin 03/28/2022 4.2  3.4 - 5.0 g/dL Final    Albumin/Globulin Ratio 03/28/2022 1.9  1.1 - 2.2 Final    Total Bilirubin 03/28/2022 0.6  0.0 - 1.0 mg/dL Final    Alkaline Phosphatase 03/28/2022 67  40 - 129 U/L Final    ALT 03/28/2022 17  10 - 40 U/L Final    AST 03/28/2022 18  15 - 37 U/L Final    WBC 03/28/2022 4.7  4.0 - 11.0 K/uL Final    RBC 03/28/2022 4.79  4.20 - 5.90 M/uL Final    Hemoglobin 03/28/2022 14.7  13.5 - 17.5 g/dL Final    Hematocrit 03/28/2022 43.8  40.5 - 52.5 % Final    MCV 03/28/2022 91.4  80.0 - 100.0 fL Final    MCH 03/28/2022 30.7  26.0 - 34.0 pg Final    MCHC 03/28/2022 33.6  31.0 - 36.0 g/dL Final    RDW 03/28/2022 13.6  12.4 - 15.4 % Final    Platelets 18/53/3020 218  135 - 450 K/uL Final    MPV 03/28/2022 8.3  5.0 - 10.5 fL Final    Neutrophils % 03/28/2022 52.5  % Final    Lymphocytes % 03/28/2022 34.7  % Final    Monocytes % 03/28/2022 9.9  % Final    Eosinophils % 03/28/2022 2.0  % Final    Basophils % 03/28/2022 0.9  % Final    Neutrophils Absolute 03/28/2022 2.5  1.7 - 7.7 K/uL Final    Lymphocytes Absolute 03/28/2022 1.6  1.0 - 5.1 K/uL Final    Monocytes Absolute 03/28/2022 0.5  0.0 - 1.3 K/uL Final    Eosinophils Absolute 03/28/2022 0.1  0.0 - 0.6 K/uL Final    Basophils Absolute 03/28/2022 0.0  0.0 - 0.2 K/uL Final    Cholesterol, Total 03/28/2022 202 (A)  0 - 199 mg/dL Final    Triglycerides 03/28/2022 136  0 - 150 mg/dL Final    HDL 03/28/2022 64 (A)  40 - 60 mg/dL Final    LDL Calculated 03/28/2022 111 (A)  <100 mg/dL Final    VLDL Cholesterol Calculated 03/28/2022 27  Not Established mg/dL Final    T4 Free 03/28/2022 1.4  0.9 - 1.8 ng/dL Final    TSH 03/28/2022 1.45  0.27 - 4.20 uIU/mL Final       Review of Systems   Musculoskeletal:  Positive for arthralgias. /78 (Site: Right Upper Arm, Position: Sitting, Cuff Size: Large Adult)   Pulse 69   Temp 97.5 °F (36.4 °C) (Temporal)   Resp 16   Wt 175 lb 3.2 oz (79.5 kg)   SpO2 96%   BMI 24.15 kg/m²     Physical Exam  Vitals reviewed. Musculoskeletal:         General: Tenderness (right trochanteric) present. Plan   Diagnosis Orders   1. Annual physical exam        2. Right hip pain        3. Trochanteric bursitis of right hip  methylPREDNISolone (MEDROL DOSEPACK) 4 MG tablet    diclofenac sodium (VOLTAREN) 1 % GEL    meloxicam (MOBIC) 15 MG tablet      4. Pure hypercholesterolemia  TSH    Lipid Panel    CBC with Auto Differential    Comprehensive Metabolic Panel      5. Prostate cancer screening  PSA Screening        Wait until finishing the Medrol dose Pack before starting Mobic if needed. Return in about 5 months (around 5/1/2023) for Physical Exam.    Prior to Visit Medications    Medication Sig Taking? Authorizing Provider   methylPREDNISolone (MEDROL DOSEPACK) 4 MG tablet Take by mouth. Yes Cyril Lobo, DO   diclofenac sodium (VOLTAREN) 1 % GEL Apply 2 g topically 4 times daily Yes Cyril Lobo, DO   meloxicam (MOBIC) 15 MG tablet Take 1 tablet by mouth daily Yes Cyril Lobo, DO   atorvastatin (LIPITOR) 20 MG tablet TAKE 1 TABLET BY MOUTH EVERY DAY Yes Cyril Lobo, DO   aluminum chloride (DRYSOL) 20 % external solution Apply topically nightly.  Yes Cyril Lobo, DO

## 2022-12-12 ENCOUNTER — NURSE ONLY (OUTPATIENT)
Dept: FAMILY MEDICINE CLINIC | Age: 62
End: 2022-12-12
Payer: COMMERCIAL

## 2022-12-12 PROCEDURE — 90674 CCIIV4 VAC NO PRSV 0.5 ML IM: CPT | Performed by: FAMILY MEDICINE

## 2022-12-12 PROCEDURE — 90471 IMMUNIZATION ADMIN: CPT | Performed by: FAMILY MEDICINE

## 2022-12-27 ENCOUNTER — TELEPHONE (OUTPATIENT)
Dept: FAMILY MEDICINE CLINIC | Age: 62
End: 2022-12-27

## 2022-12-27 NOTE — TELEPHONE ENCOUNTER
Pt states he was following up with Dr Joseph Otto. States he finished the Medrol Dose pack, but states Dr Rosea Olszewski told him not to start the Meloxicam right away. Is still having pain and wants to know if he can take the Meloxicam. I advised per Dr Bhavesh Bonner note, he was to wait to start the Meloxicam until he finished the Medrol dose pack. Advised he can start the Meloxicam now.  Pt will follow up after finishing Meloxicam.

## 2023-01-24 ENCOUNTER — TELEPHONE (OUTPATIENT)
Dept: FAMILY MEDICINE CLINIC | Age: 63
End: 2023-01-24

## 2023-01-24 DIAGNOSIS — M25.551 RIGHT HIP PAIN: Primary | ICD-10-CM

## 2023-01-24 NOTE — TELEPHONE ENCOUNTER
----- Message from Swapna Samuel sent at 1/24/2023  2:00 PM EST -----  Subject: Message to Provider    QUESTIONS  Information for Provider? PT Has finish medication that was prescribed for   the Hip pain and it has not helped. He is still in the same amount of pain   as before. Please call PT and let him know if he needs to see Dr. Hilton Bryant again or if the Dr can give PT a referral.   ---------------------------------------------------------------------------  --------------  [address] [employer]  [phone]; OK to leave message on voicemail  ---------------------------------------------------------------------------  --------------  SCRIPT ANSWERS  Relationship to Patient?  Self

## 2023-01-24 NOTE — TELEPHONE ENCOUNTER
Last visit 12/1/22    Pt has finished Medrol Dose pack and Meloxicam. Still having the hip pain, would like referral.     Please advise.

## 2023-04-25 DIAGNOSIS — E78.00 PURE HYPERCHOLESTEROLEMIA: ICD-10-CM

## 2023-04-25 DIAGNOSIS — Z12.5 PROSTATE CANCER SCREENING: ICD-10-CM

## 2023-04-25 LAB
ALBUMIN SERPL-MCNC: 4.4 G/DL (ref 3.4–5)
ALBUMIN/GLOB SERPL: 2 {RATIO} (ref 1.1–2.2)
ALP SERPL-CCNC: 73 U/L (ref 40–129)
ALT SERPL-CCNC: 16 U/L (ref 10–40)
ANION GAP SERPL CALCULATED.3IONS-SCNC: 13 MMOL/L (ref 3–16)
AST SERPL-CCNC: 21 U/L (ref 15–37)
BASOPHILS # BLD: 0.1 K/UL (ref 0–0.2)
BASOPHILS NFR BLD: 1 %
BILIRUB SERPL-MCNC: 0.5 MG/DL (ref 0–1)
BUN SERPL-MCNC: 17 MG/DL (ref 7–20)
CALCIUM SERPL-MCNC: 9.4 MG/DL (ref 8.3–10.6)
CHLORIDE SERPL-SCNC: 106 MMOL/L (ref 99–110)
CHOLEST SERPL-MCNC: 216 MG/DL (ref 0–199)
CO2 SERPL-SCNC: 23 MMOL/L (ref 21–32)
CREAT SERPL-MCNC: 1 MG/DL (ref 0.8–1.3)
DEPRECATED RDW RBC AUTO: 13.3 % (ref 12.4–15.4)
EOSINOPHIL # BLD: 0.1 K/UL (ref 0–0.6)
EOSINOPHIL NFR BLD: 2.3 %
GFR SERPLBLD CREATININE-BSD FMLA CKD-EPI: >60 ML/MIN/{1.73_M2}
GLUCOSE SERPL-MCNC: 107 MG/DL (ref 70–99)
HCT VFR BLD AUTO: 44.3 % (ref 40.5–52.5)
HDLC SERPL-MCNC: 64 MG/DL (ref 40–60)
HGB BLD-MCNC: 15 G/DL (ref 13.5–17.5)
LDLC SERPL CALC-MCNC: 132 MG/DL
LYMPHOCYTES # BLD: 1.9 K/UL (ref 1–5.1)
LYMPHOCYTES NFR BLD: 35.9 %
MCH RBC QN AUTO: 30.6 PG (ref 26–34)
MCHC RBC AUTO-ENTMCNC: 33.9 G/DL (ref 31–36)
MCV RBC AUTO: 90.5 FL (ref 80–100)
MONOCYTES # BLD: 0.4 K/UL (ref 0–1.3)
MONOCYTES NFR BLD: 8.6 %
NEUTROPHILS # BLD: 2.7 K/UL (ref 1.7–7.7)
NEUTROPHILS NFR BLD: 52.2 %
PLATELET # BLD AUTO: 198 K/UL (ref 135–450)
PMV BLD AUTO: 8.6 FL (ref 5–10.5)
POTASSIUM SERPL-SCNC: 4.3 MMOL/L (ref 3.5–5.1)
PROT SERPL-MCNC: 6.6 G/DL (ref 6.4–8.2)
PSA SERPL DL<=0.01 NG/ML-MCNC: 1.09 NG/ML (ref 0–4)
RBC # BLD AUTO: 4.89 M/UL (ref 4.2–5.9)
SODIUM SERPL-SCNC: 142 MMOL/L (ref 136–145)
TRIGL SERPL-MCNC: 100 MG/DL (ref 0–150)
TSH SERPL DL<=0.005 MIU/L-ACNC: 1.67 UIU/ML (ref 0.27–4.2)
VLDLC SERPL CALC-MCNC: 20 MG/DL
WBC # BLD AUTO: 5.2 K/UL (ref 4–11)

## 2023-04-27 ENCOUNTER — OFFICE VISIT (OUTPATIENT)
Dept: FAMILY MEDICINE CLINIC | Age: 63
End: 2023-04-27
Payer: COMMERCIAL

## 2023-04-27 VITALS
DIASTOLIC BLOOD PRESSURE: 80 MMHG | HEART RATE: 59 BPM | HEIGHT: 71 IN | BODY MASS INDEX: 24.86 KG/M2 | RESPIRATION RATE: 16 BRPM | TEMPERATURE: 96.6 F | OXYGEN SATURATION: 95 % | WEIGHT: 177.6 LBS | SYSTOLIC BLOOD PRESSURE: 128 MMHG

## 2023-04-27 DIAGNOSIS — M21.961 ACQUIRED DEFORMITY OF RIGHT KNEE: ICD-10-CM

## 2023-04-27 DIAGNOSIS — E78.00 ELEVATED LDL CHOLESTEROL LEVEL: ICD-10-CM

## 2023-04-27 DIAGNOSIS — Z00.00 ANNUAL PHYSICAL EXAM: Primary | ICD-10-CM

## 2023-04-27 DIAGNOSIS — R73.01 ELEVATED FASTING GLUCOSE: ICD-10-CM

## 2023-04-27 PROCEDURE — 99396 PREV VISIT EST AGE 40-64: CPT | Performed by: FAMILY MEDICINE

## 2023-04-27 SDOH — ECONOMIC STABILITY: FOOD INSECURITY: WITHIN THE PAST 12 MONTHS, THE FOOD YOU BOUGHT JUST DIDN'T LAST AND YOU DIDN'T HAVE MONEY TO GET MORE.: NEVER TRUE

## 2023-04-27 SDOH — ECONOMIC STABILITY: INCOME INSECURITY: HOW HARD IS IT FOR YOU TO PAY FOR THE VERY BASICS LIKE FOOD, HOUSING, MEDICAL CARE, AND HEATING?: NOT HARD AT ALL

## 2023-04-27 SDOH — ECONOMIC STABILITY: FOOD INSECURITY: WITHIN THE PAST 12 MONTHS, YOU WORRIED THAT YOUR FOOD WOULD RUN OUT BEFORE YOU GOT MONEY TO BUY MORE.: NEVER TRUE

## 2023-04-27 ASSESSMENT — PATIENT HEALTH QUESTIONNAIRE - PHQ9
SUM OF ALL RESPONSES TO PHQ QUESTIONS 1-9: 0
1. LITTLE INTEREST OR PLEASURE IN DOING THINGS: 0
SUM OF ALL RESPONSES TO PHQ QUESTIONS 1-9: 0
SUM OF ALL RESPONSES TO PHQ QUESTIONS 1-9: 0
2. FEELING DOWN, DEPRESSED OR HOPELESS: 0
SUM OF ALL RESPONSES TO PHQ QUESTIONS 1-9: 0
SUM OF ALL RESPONSES TO PHQ9 QUESTIONS 1 & 2: 0

## 2023-04-27 ASSESSMENT — ENCOUNTER SYMPTOMS
COLOR CHANGE: 0
BACK PAIN: 0
SHORTNESS OF BREATH: 0
ABDOMINAL PAIN: 0

## 2023-04-27 NOTE — PROGRESS NOTES
Dickson Lomax  YOB: 1960    Date of Service:  4/27/2023    Chief Complaint:   Dickson Lomax is a 63 y.o. male who presents for complete physical examination. HPI:  HPI     Labs from April 25, 2023 reviewed at today's visit, within normal limits except for glucose 107, total cholesterol 216, HDL 64, . Taking Lipitor 20 mg daily. Self-testicular exams: Yes  Sexual activity: Yes   Last eye exam: In the last 18 months,normal  Exercise: Walking  Seatbelt use:  Yes  Are You a Spiritual person: Yes  Living will: No    Wt Readings from Last 3 Encounters:   04/27/23 177 lb 9.6 oz (80.6 kg)   12/01/22 175 lb 3.2 oz (79.5 kg)   04/29/22 185 lb (83.9 kg)     BP Readings from Last 3 Encounters:   04/27/23 128/80   12/01/22 118/78   04/26/22 120/70       Patient Active Problem List   Diagnosis    Palpitations    Panic disorder without agoraphobia    Displacement of cervical intervertebral disc without myelopathy    Brachial neuritis or radiculitis    Other and unspecified hyperlipidemia    Shoulder pain    Ear pain    History of colonic polyps    Chronic left hip pain    Change in voice    Pure hypercholesterolemia    Left-sided chest wall pain       Health Maintenance   Topic Date Due    COVID-19 Vaccine (4 - Booster for Moderna series) 02/08/2022    Depression Screen  12/01/2023    Lipids  04/25/2024    DTaP/Tdap/Td vaccine (2 - Td or Tdap) 12/02/2024    Colorectal Cancer Screen  05/07/2026    Flu vaccine  Completed    Shingles vaccine  Completed    Hepatitis C screen  Completed    HIV screen  Completed    Hepatitis A vaccine  Aged Out    Hib vaccine  Aged Out    Meningococcal (ACWY) vaccine  Aged Out    Pneumococcal 0-64 years Vaccine  Aged Out    Prostate Specific Antigen (PSA) Screening or Monitoring  Discontinued       Immunization History   Administered Date(s) Administered    COVID-19, MODERNA BLUE border, Primary or Immunocompromised, (age 12y+), IM, 100 mcg/0.5mL 05/14/2021, 06/11/2021,

## 2023-05-23 ENCOUNTER — OFFICE VISIT (OUTPATIENT)
Dept: ORTHOPEDIC SURGERY | Age: 63
End: 2023-05-23

## 2023-05-23 VITALS — BODY MASS INDEX: 24.78 KG/M2 | HEIGHT: 71 IN | WEIGHT: 177 LBS

## 2023-05-23 DIAGNOSIS — M70.51 INFRAPATELLAR BURSITIS OF RIGHT KNEE: ICD-10-CM

## 2023-05-23 DIAGNOSIS — M92.521 OSGOOD-SCHLATTER'S DISEASE OF RIGHT LOWER EXTREMITY: Primary | ICD-10-CM

## 2023-05-23 DIAGNOSIS — M25.561 RIGHT KNEE PAIN, UNSPECIFIED CHRONICITY: ICD-10-CM

## 2023-05-23 RX ORDER — MELOXICAM 15 MG/1
15 TABLET ORAL DAILY PRN
Qty: 30 TABLET | Refills: 3 | Status: SHIPPED | OUTPATIENT
Start: 2023-05-23

## 2023-05-23 NOTE — PROGRESS NOTES
KNEE VISIT      HISTORY OF PRESENT ILLNESS    Nic Kim is a 63 y.o. male who presents for consultation at the request of Dr. Onesimo Irby, for chief complaint of right knee pain. He noticed a bump on the front of his right knee and has had on the opposite side also but he can kneel on his left side but over the last few years he has had an increasing calcium buildup which is hard and makes it difficult to kneel down in that knee. He grades pain 10/10 when he kneels. He denies any history of trauma. He has noticed some pain in his foot and ankle in his hip now with walking which appears to be more compensatory because of his knee is making him modify. ROS    Well-documented patient history form dated 5/23/2023  All other ROS negative except for above. Past Surgical history    Past Surgical History:   Procedure Laterality Date    CERVICAL SPINE SURGERY  2005    c6-7    SHOULDER SURGERY Left 12/2020    TONSILLECTOMY AND ADENOIDECTOMY         PAST MEDICAL    Past Medical History:   Diagnosis Date    Brachial neuritis or radiculitis NOS 11/9/2010    Displacement of cervical intervertebral disc without myelopathy 11/9/2010    Other and unspecified hyperlipidemia 11/9/2010    Palpitations 11/9/2010    Panic disorder without agoraphobia 11/9/2010       Allergies    Allergies   Allergen Reactions    Other      Generic allergy pill       Meds    Current Outpatient Medications   Medication Sig Dispense Refill    meloxicam (MOBIC) 15 MG tablet Take 1 tablet by mouth daily as needed for Pain 30 tablet 3    atorvastatin (LIPITOR) 20 MG tablet TAKE 1 TABLET BY MOUTH EVERY DAY 90 tablet 1     No current facility-administered medications for this visit.        Social    Social History     Socioeconomic History    Marital status:      Spouse name: Not on file    Number of children: Not on file    Years of education: Not on file    Highest education level: Not on file   Occupational History    Not on file

## 2023-07-25 ENCOUNTER — OFFICE VISIT (OUTPATIENT)
Dept: FAMILY MEDICINE CLINIC | Age: 63
End: 2023-07-25
Payer: COMMERCIAL

## 2023-07-25 VITALS
DIASTOLIC BLOOD PRESSURE: 73 MMHG | HEART RATE: 71 BPM | RESPIRATION RATE: 16 BRPM | TEMPERATURE: 97.1 F | BODY MASS INDEX: 23.36 KG/M2 | SYSTOLIC BLOOD PRESSURE: 119 MMHG | WEIGHT: 169.4 LBS | OXYGEN SATURATION: 94 %

## 2023-07-25 DIAGNOSIS — B96.89 ACUTE BACTERIAL SINUSITIS: ICD-10-CM

## 2023-07-25 DIAGNOSIS — J01.90 ACUTE BACTERIAL SINUSITIS: ICD-10-CM

## 2023-07-25 DIAGNOSIS — R05.2 SUBACUTE COUGH: Primary | ICD-10-CM

## 2023-07-25 DIAGNOSIS — J34.89 SINUS PRESSURE: ICD-10-CM

## 2023-07-25 PROCEDURE — G8427 DOCREV CUR MEDS BY ELIG CLIN: HCPCS | Performed by: FAMILY MEDICINE

## 2023-07-25 PROCEDURE — G8420 CALC BMI NORM PARAMETERS: HCPCS | Performed by: FAMILY MEDICINE

## 2023-07-25 PROCEDURE — 3017F COLORECTAL CA SCREEN DOC REV: CPT | Performed by: FAMILY MEDICINE

## 2023-07-25 PROCEDURE — 99213 OFFICE O/P EST LOW 20 MIN: CPT | Performed by: FAMILY MEDICINE

## 2023-07-25 PROCEDURE — 1036F TOBACCO NON-USER: CPT | Performed by: FAMILY MEDICINE

## 2023-07-25 RX ORDER — BENZONATATE 100 MG/1
100 CAPSULE ORAL 3 TIMES DAILY PRN
Qty: 30 CAPSULE | Refills: 0 | Status: SHIPPED | OUTPATIENT
Start: 2023-07-25

## 2023-07-25 RX ORDER — ATORVASTATIN CALCIUM 20 MG/1
TABLET, FILM COATED ORAL
Qty: 90 TABLET | Refills: 1 | Status: SHIPPED | OUTPATIENT
Start: 2023-07-25

## 2023-07-25 RX ORDER — AMOXICILLIN AND CLAVULANATE POTASSIUM 875; 125 MG/1; MG/1
1 TABLET, FILM COATED ORAL 2 TIMES DAILY
Qty: 14 TABLET | Refills: 0 | Status: SHIPPED | OUTPATIENT
Start: 2023-07-25 | End: 2023-08-01

## 2023-07-25 RX ORDER — FLUTICASONE PROPIONATE 50 MCG
1 SPRAY, SUSPENSION (ML) NASAL DAILY
Qty: 1 EACH | Refills: 0 | Status: SHIPPED | OUTPATIENT
Start: 2023-07-25 | End: 2023-08-01

## 2023-07-25 ASSESSMENT — ENCOUNTER SYMPTOMS
RHINORRHEA: 1
COUGH: 1
SINUS PRESSURE: 1

## 2023-07-25 NOTE — TELEPHONE ENCOUNTER
4/27/2023    Future Appointments   Date Time Provider [address]   7/25/2023  9:40 AM DO KELSEY Carter   10/31/2023  9:40 AM DO KELSEY Carter

## 2023-07-25 NOTE — PROGRESS NOTES
6.4 - 8.2 g/dL Final    Albumin 04/25/2023 4.4  3.4 - 5.0 g/dL Final    Albumin/Globulin Ratio 04/25/2023 2.0  1.1 - 2.2 Final    Total Bilirubin 04/25/2023 0.5  0.0 - 1.0 mg/dL Final    Alkaline Phosphatase 04/25/2023 73  40 - 129 U/L Final    ALT 04/25/2023 16  10 - 40 U/L Final    AST 04/25/2023 21  15 - 37 U/L Final    WBC 04/25/2023 5.2  4.0 - 11.0 K/uL Final    RBC 04/25/2023 4.89  4.20 - 5.90 M/uL Final    Hemoglobin 04/25/2023 15.0  13.5 - 17.5 g/dL Final    Hematocrit 04/25/2023 44.3  40.5 - 52.5 % Final    MCV 04/25/2023 90.5  80.0 - 100.0 fL Final    MCH 04/25/2023 30.6  26.0 - 34.0 pg Final    MCHC 04/25/2023 33.9  31.0 - 36.0 g/dL Final    RDW 04/25/2023 13.3  12.4 - 15.4 % Final    Platelets 74/22/5110 198  135 - 450 K/uL Final    MPV 04/25/2023 8.6  5.0 - 10.5 fL Final    Neutrophils % 04/25/2023 52.2  % Final    Lymphocytes % 04/25/2023 35.9  % Final    Monocytes % 04/25/2023 8.6  % Final    Eosinophils % 04/25/2023 2.3  % Final    Basophils % 04/25/2023 1.0  % Final    Neutrophils Absolute 04/25/2023 2.7  1.7 - 7.7 K/uL Final    Lymphocytes Absolute 04/25/2023 1.9  1.0 - 5.1 K/uL Final    Monocytes Absolute 04/25/2023 0.4  0.0 - 1.3 K/uL Final    Eosinophils Absolute 04/25/2023 0.1  0.0 - 0.6 K/uL Final    Basophils Absolute 04/25/2023 0.1  0.0 - 0.2 K/uL Final    Cholesterol, Total 04/25/2023 216 (H)  0 - 199 mg/dL Final    Triglycerides 04/25/2023 100  0 - 150 mg/dL Final    HDL 04/25/2023 64 (H)  40 - 60 mg/dL Final    Comment: An HDL cholesterol less than 40 mg/dL is low and  constitutes a coronary heart disease risk factor. An HDL cholesterol greater than 60 mg/dL is a  negative risk factor for coronary heart disease. LDL Calculated 04/25/2023 132 (H)  <100 mg/dL Final    VLDL Cholesterol Calculated 04/25/2023 20  Not Established mg/dL Final    TSH 04/25/2023 1.67  0.27 - 4.20 uIU/mL Final       Review of Systems   Constitutional:  Positive for fatigue.    HENT:  Positive for rhinorrhea

## 2023-08-16 DIAGNOSIS — J01.90 ACUTE BACTERIAL SINUSITIS: ICD-10-CM

## 2023-08-16 DIAGNOSIS — R05.2 SUBACUTE COUGH: ICD-10-CM

## 2023-08-16 DIAGNOSIS — J34.89 SINUS PRESSURE: ICD-10-CM

## 2023-08-16 DIAGNOSIS — B96.89 ACUTE BACTERIAL SINUSITIS: ICD-10-CM

## 2023-08-16 RX ORDER — FLUTICASONE PROPIONATE 50 MCG
SPRAY, SUSPENSION (ML) NASAL
Qty: 1 EACH | Refills: 0 | Status: SHIPPED | OUTPATIENT
Start: 2023-08-16

## 2023-08-16 NOTE — TELEPHONE ENCOUNTER
7/25/2023          Future Appointments   Date Time Provider [address]   10/31/2023  9:40 AM DO KELSEY Jacques

## 2023-10-30 DIAGNOSIS — R73.01 ELEVATED FASTING GLUCOSE: ICD-10-CM

## 2023-10-30 DIAGNOSIS — E78.00 ELEVATED LDL CHOLESTEROL LEVEL: ICD-10-CM

## 2023-10-30 LAB
CHOLEST SERPL-MCNC: 234 MG/DL (ref 0–199)
GLUCOSE P FAST SERPL-MCNC: 113 MG/DL (ref 70–99)
HDLC SERPL-MCNC: 87 MG/DL (ref 40–60)
LDLC SERPL CALC-MCNC: 135 MG/DL
TRIGL SERPL-MCNC: 61 MG/DL (ref 0–150)
VLDLC SERPL CALC-MCNC: 12 MG/DL

## 2023-10-31 ENCOUNTER — OFFICE VISIT (OUTPATIENT)
Dept: FAMILY MEDICINE CLINIC | Age: 63
End: 2023-10-31
Payer: COMMERCIAL

## 2023-10-31 VITALS
BODY MASS INDEX: 22.42 KG/M2 | RESPIRATION RATE: 16 BRPM | SYSTOLIC BLOOD PRESSURE: 114 MMHG | DIASTOLIC BLOOD PRESSURE: 74 MMHG | TEMPERATURE: 97.2 F | OXYGEN SATURATION: 96 % | HEART RATE: 65 BPM | WEIGHT: 162.6 LBS

## 2023-10-31 DIAGNOSIS — R73.01 ELEVATED FASTING GLUCOSE: ICD-10-CM

## 2023-10-31 DIAGNOSIS — Z12.5 PROSTATE CANCER SCREENING: ICD-10-CM

## 2023-10-31 DIAGNOSIS — Z23 NEEDS FLU SHOT: ICD-10-CM

## 2023-10-31 DIAGNOSIS — E78.00 ELEVATED LDL CHOLESTEROL LEVEL: Primary | ICD-10-CM

## 2023-10-31 PROCEDURE — G8420 CALC BMI NORM PARAMETERS: HCPCS | Performed by: FAMILY MEDICINE

## 2023-10-31 PROCEDURE — G8482 FLU IMMUNIZE ORDER/ADMIN: HCPCS | Performed by: FAMILY MEDICINE

## 2023-10-31 PROCEDURE — 90471 IMMUNIZATION ADMIN: CPT | Performed by: FAMILY MEDICINE

## 2023-10-31 PROCEDURE — 99213 OFFICE O/P EST LOW 20 MIN: CPT | Performed by: FAMILY MEDICINE

## 2023-10-31 PROCEDURE — 90674 CCIIV4 VAC NO PRSV 0.5 ML IM: CPT | Performed by: FAMILY MEDICINE

## 2023-10-31 PROCEDURE — G8427 DOCREV CUR MEDS BY ELIG CLIN: HCPCS | Performed by: FAMILY MEDICINE

## 2023-10-31 PROCEDURE — 3017F COLORECTAL CA SCREEN DOC REV: CPT | Performed by: FAMILY MEDICINE

## 2023-10-31 PROCEDURE — 1036F TOBACCO NON-USER: CPT | Performed by: FAMILY MEDICINE

## 2023-10-31 ASSESSMENT — ENCOUNTER SYMPTOMS: SHORTNESS OF BREATH: 0

## 2023-10-31 ASSESSMENT — PATIENT HEALTH QUESTIONNAIRE - PHQ9
SUM OF ALL RESPONSES TO PHQ QUESTIONS 1-9: 0
2. FEELING DOWN, DEPRESSED OR HOPELESS: 0
SUM OF ALL RESPONSES TO PHQ9 QUESTIONS 1 & 2: 0
SUM OF ALL RESPONSES TO PHQ QUESTIONS 1-9: 0
1. LITTLE INTEREST OR PLEASURE IN DOING THINGS: 0

## 2023-10-31 NOTE — PROGRESS NOTES
10/31/2023    This is a 66 y.o. male   Chief Complaint   Patient presents with    Medication Check   . HPI  Pt presents today for the following:     Elevated LDL Cholesterol: taking Lipitor 20 mg daily, lipid panel from yesterday showed total cholesterol 234, HDL 87, , and triglycerides 61. Glucose 113, mother had diabetes, father has HTN but has had bypass surgeries. Pt not eating as much and being more physical, has lost almost 7 pounds since July 2023. Denies CP or SOB. The 10-year ASCVD risk score (Song COON, et al., 2019) is: 6%    Values used to calculate the score:      Age: 66 years      Sex: Male      Is Non- : No      Diabetic: No      Tobacco smoker: No      Systolic Blood Pressure: 026 mmHg      Is BP treated: No      HDL Cholesterol: 87 mg/dL      Total Cholesterol: 234 mg/dL    Past Medical History:   Diagnosis Date    Brachial neuritis or radiculitis NOS 11/9/2010    Displacement of cervical intervertebral disc without myelopathy 11/9/2010    Other and unspecified hyperlipidemia 11/9/2010    Palpitations 11/9/2010    Panic disorder without agoraphobia 11/9/2010       Orders Only on 10/30/2023   Component Date Value Ref Range Status    Glucose, Fasting 10/30/2023 113 (H)  70 - 99 mg/dL Final    Cholesterol, Total 10/30/2023 234 (H)  0 - 199 mg/dL Final    Triglycerides 10/30/2023 61  0 - 150 mg/dL Final    HDL 10/30/2023 87 (H)  40 - 60 mg/dL Final    Comment: An HDL cholesterol less than 40 mg/dL is low and  constitutes a coronary heart disease risk factor. An HDL cholesterol greater than 60 mg/dL is a  negative risk factor for coronary heart disease. LDL Calculated 10/30/2023 135 (H)  <100 mg/dL Final    VLDL Cholesterol Calculated 10/30/2023 12  Not Established mg/dL Final       Review of Systems   Respiratory:  Negative for shortness of breath. Cardiovascular:  Negative for chest pain.        /74 (Site: Left Upper Arm, Position: Sitting,

## 2024-05-28 RX ORDER — ATORVASTATIN CALCIUM 20 MG/1
TABLET, FILM COATED ORAL
Qty: 90 TABLET | Refills: 1 | Status: SHIPPED | OUTPATIENT
Start: 2024-05-28

## 2024-09-11 ENCOUNTER — TELEPHONE (OUTPATIENT)
Dept: FAMILY MEDICINE CLINIC | Age: 64
End: 2024-09-11

## 2024-09-12 ENCOUNTER — OFFICE VISIT (OUTPATIENT)
Dept: FAMILY MEDICINE CLINIC | Age: 64
End: 2024-09-12
Payer: COMMERCIAL

## 2024-09-12 VITALS
OXYGEN SATURATION: 96 % | SYSTOLIC BLOOD PRESSURE: 137 MMHG | WEIGHT: 170.2 LBS | BODY MASS INDEX: 23.47 KG/M2 | HEART RATE: 61 BPM | DIASTOLIC BLOOD PRESSURE: 77 MMHG | RESPIRATION RATE: 16 BRPM | TEMPERATURE: 98.4 F

## 2024-09-12 DIAGNOSIS — G89.29 CHRONIC LEFT-SIDED THORACIC BACK PAIN: Primary | ICD-10-CM

## 2024-09-12 DIAGNOSIS — M54.6 CHRONIC LEFT-SIDED THORACIC BACK PAIN: Primary | ICD-10-CM

## 2024-09-12 DIAGNOSIS — R20.0 LEFT ARM NUMBNESS: ICD-10-CM

## 2024-09-12 PROCEDURE — 3017F COLORECTAL CA SCREEN DOC REV: CPT | Performed by: FAMILY MEDICINE

## 2024-09-12 PROCEDURE — 99213 OFFICE O/P EST LOW 20 MIN: CPT | Performed by: FAMILY MEDICINE

## 2024-09-12 PROCEDURE — 1036F TOBACCO NON-USER: CPT | Performed by: FAMILY MEDICINE

## 2024-09-12 PROCEDURE — G8427 DOCREV CUR MEDS BY ELIG CLIN: HCPCS | Performed by: FAMILY MEDICINE

## 2024-09-12 PROCEDURE — G8420 CALC BMI NORM PARAMETERS: HCPCS | Performed by: FAMILY MEDICINE

## 2024-09-12 RX ORDER — LIDOCAINE/PRILOCAINE 2.5 %-2.5%
CREAM (GRAM) TOPICAL
Qty: 1 EACH | Refills: 0 | Status: SHIPPED | OUTPATIENT
Start: 2024-09-12

## 2024-09-12 RX ORDER — METHYLPREDNISOLONE 4 MG
TABLET, DOSE PACK ORAL
Qty: 1 KIT | Refills: 0 | Status: SHIPPED | OUTPATIENT
Start: 2024-09-12 | End: 2024-09-18

## 2024-09-12 SDOH — ECONOMIC STABILITY: FOOD INSECURITY: WITHIN THE PAST 12 MONTHS, THE FOOD YOU BOUGHT JUST DIDN'T LAST AND YOU DIDN'T HAVE MONEY TO GET MORE.: NEVER TRUE

## 2024-09-12 SDOH — ECONOMIC STABILITY: FOOD INSECURITY: WITHIN THE PAST 12 MONTHS, YOU WORRIED THAT YOUR FOOD WOULD RUN OUT BEFORE YOU GOT MONEY TO BUY MORE.: NEVER TRUE

## 2024-09-12 SDOH — ECONOMIC STABILITY: INCOME INSECURITY: HOW HARD IS IT FOR YOU TO PAY FOR THE VERY BASICS LIKE FOOD, HOUSING, MEDICAL CARE, AND HEATING?: NOT HARD AT ALL

## 2024-09-12 ASSESSMENT — PATIENT HEALTH QUESTIONNAIRE - PHQ9
SUM OF ALL RESPONSES TO PHQ QUESTIONS 1-9: 0
1. LITTLE INTEREST OR PLEASURE IN DOING THINGS: NOT AT ALL
SUM OF ALL RESPONSES TO PHQ9 QUESTIONS 1 & 2: 0
2. FEELING DOWN, DEPRESSED OR HOPELESS: NOT AT ALL
SUM OF ALL RESPONSES TO PHQ QUESTIONS 1-9: 0

## 2024-09-12 ASSESSMENT — ENCOUNTER SYMPTOMS: BACK PAIN: 1

## 2025-01-22 RX ORDER — ATORVASTATIN CALCIUM 20 MG/1
TABLET, FILM COATED ORAL
Qty: 90 TABLET | Refills: 1 | Status: SHIPPED | OUTPATIENT
Start: 2025-01-22

## 2025-03-26 ENCOUNTER — OFFICE VISIT (OUTPATIENT)
Dept: FAMILY MEDICINE CLINIC | Age: 65
End: 2025-03-26
Payer: COMMERCIAL

## 2025-03-26 ENCOUNTER — TELEPHONE (OUTPATIENT)
Dept: FAMILY MEDICINE CLINIC | Age: 65
End: 2025-03-26

## 2025-03-26 VITALS
OXYGEN SATURATION: 95 % | SYSTOLIC BLOOD PRESSURE: 117 MMHG | TEMPERATURE: 97.5 F | RESPIRATION RATE: 16 BRPM | BODY MASS INDEX: 25.98 KG/M2 | HEIGHT: 71 IN | DIASTOLIC BLOOD PRESSURE: 66 MMHG | WEIGHT: 185.6 LBS | HEART RATE: 60 BPM

## 2025-03-26 DIAGNOSIS — M99.9 NONALLOPATHIC LESION OF THORACIC REGION: ICD-10-CM

## 2025-03-26 DIAGNOSIS — K21.9 GASTROESOPHAGEAL REFLUX DISEASE, UNSPECIFIED WHETHER ESOPHAGITIS PRESENT: Primary | ICD-10-CM

## 2025-03-26 PROCEDURE — 98925 OSTEOPATH MANJ 1-2 REGIONS: CPT | Performed by: FAMILY MEDICINE

## 2025-03-26 PROCEDURE — 3017F COLORECTAL CA SCREEN DOC REV: CPT | Performed by: FAMILY MEDICINE

## 2025-03-26 PROCEDURE — 99214 OFFICE O/P EST MOD 30 MIN: CPT | Performed by: FAMILY MEDICINE

## 2025-03-26 PROCEDURE — G8427 DOCREV CUR MEDS BY ELIG CLIN: HCPCS | Performed by: FAMILY MEDICINE

## 2025-03-26 PROCEDURE — G8419 CALC BMI OUT NRM PARAM NOF/U: HCPCS | Performed by: FAMILY MEDICINE

## 2025-03-26 PROCEDURE — 1036F TOBACCO NON-USER: CPT | Performed by: FAMILY MEDICINE

## 2025-03-26 RX ORDER — METHYLPREDNISOLONE 4 MG/1
TABLET ORAL
Qty: 1 KIT | Refills: 0 | Status: SHIPPED | OUTPATIENT
Start: 2025-03-26 | End: 2025-04-01

## 2025-03-26 RX ORDER — OMEPRAZOLE 20 MG/1
20 TABLET, DELAYED RELEASE ORAL DAILY
Qty: 30 TABLET | Refills: 3 | Status: SHIPPED | OUTPATIENT
Start: 2025-03-26

## 2025-03-26 SDOH — ECONOMIC STABILITY: FOOD INSECURITY: WITHIN THE PAST 12 MONTHS, THE FOOD YOU BOUGHT JUST DIDN'T LAST AND YOU DIDN'T HAVE MONEY TO GET MORE.: NEVER TRUE

## 2025-03-26 SDOH — ECONOMIC STABILITY: FOOD INSECURITY: WITHIN THE PAST 12 MONTHS, YOU WORRIED THAT YOUR FOOD WOULD RUN OUT BEFORE YOU GOT MONEY TO BUY MORE.: NEVER TRUE

## 2025-03-26 ASSESSMENT — PATIENT HEALTH QUESTIONNAIRE - PHQ9
1. LITTLE INTEREST OR PLEASURE IN DOING THINGS: NOT AT ALL
2. FEELING DOWN, DEPRESSED OR HOPELESS: NOT AT ALL
SUM OF ALL RESPONSES TO PHQ QUESTIONS 1-9: 0

## 2025-03-26 ASSESSMENT — ENCOUNTER SYMPTOMS: BACK PAIN: 1

## 2025-03-26 NOTE — TELEPHONE ENCOUNTER
Radha Preston on [address] asked if the medication for   omeprazole (PRILOSEC OTC) 20 MG tablet   Could be changed to capsules for insurance. Insurance will not cover tablet. Please advise

## 2025-03-26 NOTE — PROGRESS NOTES
3/26/2025    This is a 64 y.o. male   Chief Complaint   Patient presents with    Back Pain     Pain in back when laying flat x 1 month     Gastroesophageal Reflux     Belching a lot, feels it in throat    .    HPI     Pt presents today for the following:    Back Pain: left thoracic, sx present for 1 month and only when laying flat on his back, denies trauma, was doing a lot pushups at the time    GERD: admits to belching  x day and feels acid in his, throat starting to hurt ,denies dietary, denies heartburn, denies abdominal pain or bowel changes. Has GI visit scheduled for 04/07/25.   Past Medical History:   Diagnosis Date    Brachial neuritis or radiculitis NOS 11/9/2010    Displacement of cervical intervertebral disc without myelopathy 11/9/2010    Other and unspecified hyperlipidemia 11/9/2010    Palpitations 11/9/2010    Panic disorder without agoraphobia 11/9/2010       Orders Only on 10/30/2023   Component Date Value Ref Range Status    Glucose, Fasting 10/30/2023 113 (H)  70 - 99 mg/dL Final    Cholesterol, Total 10/30/2023 234 (H)  0 - 199 mg/dL Final    Triglycerides 10/30/2023 61  0 - 150 mg/dL Final    HDL 10/30/2023 87 (H)  40 - 60 mg/dL Final    Comment: An HDL cholesterol less than 40 mg/dL is low and  constitutes a coronary heart disease risk factor.  An HDL cholesterol greater than 60 mg/dL is a  negative risk factor for coronary heart disease.      LDL Calculated 10/30/2023 135 (H)  <100 mg/dL Final    VLDL Cholesterol Calculated 10/30/2023 12  Not Established mg/dL Final       Review of Systems   Gastrointestinal:         Positive for reflux   Musculoskeletal:  Positive for back pain.       /66 (BP Site: Right Upper Arm, Patient Position: Sitting, BP Cuff Size: Large Adult)   Pulse 60   Temp 97.5 °F (36.4 °C) (Temporal)   Resp 16   Ht 1.803 m (5' 11\")   Wt 84.2 kg (185 lb 9.6 oz)   SpO2 95%   BMI 25.89 kg/m²     Physical Exam  Vitals reviewed.   Psychiatric:         Mood and

## 2025-04-01 ENCOUNTER — TELEPHONE (OUTPATIENT)
Dept: FAMILY MEDICINE CLINIC | Age: 65
End: 2025-04-01

## 2025-04-01 DIAGNOSIS — G89.29 CHRONIC LEFT-SIDED THORACIC BACK PAIN: Primary | ICD-10-CM

## 2025-04-01 DIAGNOSIS — M54.6 CHRONIC LEFT-SIDED THORACIC BACK PAIN: Primary | ICD-10-CM

## 2025-04-01 NOTE — TELEPHONE ENCOUNTER
Please ask patient if the Zanaflex (muscle relaxer) helped with the pain.  If not, I will place a referral for [facility] back and spine for additional evaluation and treatment.  Thank you

## 2025-04-01 NOTE — TELEPHONE ENCOUNTER
Spoke with patient and informed of message. He said they helped some, but it has flared up again. He would like a referral.

## 2025-04-01 NOTE — TELEPHONE ENCOUNTER
Update:    Pt called. Steroids - not really helping a whole lot. He said his wife touched the spot yesterday and he feels like it aggravated it.  If he has to just wait it out that is what he will do

## 2025-04-03 ENCOUNTER — OFFICE VISIT (OUTPATIENT)
Dept: ORTHOPEDIC SURGERY | Age: 65
End: 2025-04-03
Payer: COMMERCIAL

## 2025-04-03 VITALS — WEIGHT: 185 LBS | HEIGHT: 71 IN | BODY MASS INDEX: 25.9 KG/M2

## 2025-04-03 DIAGNOSIS — Z98.1 STATUS POST CERVICAL SPINAL FUSION: ICD-10-CM

## 2025-04-03 DIAGNOSIS — M54.50 LUMBAR PAIN: Primary | ICD-10-CM

## 2025-04-03 DIAGNOSIS — S16.1XXA STRAIN OF NECK MUSCLE, INITIAL ENCOUNTER: ICD-10-CM

## 2025-04-03 PROCEDURE — 3017F COLORECTAL CA SCREEN DOC REV: CPT | Performed by: PHYSICIAN ASSISTANT

## 2025-04-03 PROCEDURE — 1036F TOBACCO NON-USER: CPT | Performed by: PHYSICIAN ASSISTANT

## 2025-04-03 PROCEDURE — G8427 DOCREV CUR MEDS BY ELIG CLIN: HCPCS | Performed by: PHYSICIAN ASSISTANT

## 2025-04-03 PROCEDURE — G8419 CALC BMI OUT NRM PARAM NOF/U: HCPCS | Performed by: PHYSICIAN ASSISTANT

## 2025-04-03 PROCEDURE — 99214 OFFICE O/P EST MOD 30 MIN: CPT | Performed by: PHYSICIAN ASSISTANT

## 2025-04-03 NOTE — PROGRESS NOTES
New Patient: CERVICAL SPINE    Referring Provider:  Oz Villafana DO    CHIEF COMPLAINT:    Chief Complaint   Patient presents with    Neck Pain     CERVICAL       HISTORY OF PRESENT ILLNESS:   Mr. Robin Abraham is a pleasant 64 y.o. old male, who has a history of a C6-7 ACDF 18 years ago, here for evaluation regarding his neck and left posterior shoulder/scapular pain. He states the pain began after doing several repetitions of push-ups approximately 1 month ago.  He states that he does not remember any specific incident but states he gradually had increasing pain over the left side of his neck into his left shoulder and scapula.  He was seen by his PCP who did place him on a steroid taper and muscle relaxant which she states has helped somewhat.  At the present time he is complaining of 3/10 left-sided neck pain with 3/10 posterior shoulder and scapular pain.  He states the pain affects him less than 1 hour a day and does  interfere with his sleep at night.  He denies any radicular symptoms into either upper extremity and denies any numbness, tingling, or progressive weakness into either upper extremity.  He denies any difficulty with fine motor movements or with  strength.  He denies any bowel or bladder incontinence or saddle anesthesia.    Pain Assessment  Location of Pain: Neck  Location Modifiers: Other (Comment)  Severity of Pain: 3  Quality of Pain: Other (Comment)  Duration of Pain: Other (Comment)  Frequency of Pain: Other (Comment)]    Current/Past Treatment:   Physical Therapy: None  Chiropractic: None  Injection: None  Medications: Steroid taper and muscle relaxant    Past Medical History:   Past Medical History:   Diagnosis Date    Brachial neuritis or radiculitis NOS 11/9/2010    Displacement of cervical intervertebral disc without myelopathy 11/9/2010    Other and unspecified hyperlipidemia 11/9/2010    Palpitations 11/9/2010    Panic disorder without agoraphobia 11/9/2010        Past

## 2025-04-07 ENCOUNTER — OFFICE VISIT (OUTPATIENT)
Dept: ENT CLINIC | Age: 65
End: 2025-04-07
Payer: COMMERCIAL

## 2025-04-07 VITALS
HEIGHT: 71 IN | TEMPERATURE: 98.1 F | SYSTOLIC BLOOD PRESSURE: 133 MMHG | BODY MASS INDEX: 25.62 KG/M2 | WEIGHT: 183 LBS | HEART RATE: 59 BPM | RESPIRATION RATE: 16 BRPM | DIASTOLIC BLOOD PRESSURE: 78 MMHG

## 2025-04-07 DIAGNOSIS — K21.9 LARYNGOPHARYNGEAL REFLUX (LPR): ICD-10-CM

## 2025-04-07 DIAGNOSIS — J38.3 VOCAL PROCESS GRANULOMA: ICD-10-CM

## 2025-04-07 DIAGNOSIS — R14.2 BELCHING: Primary | ICD-10-CM

## 2025-04-07 DIAGNOSIS — R13.13 PHARYNGEAL DYSPHAGIA: ICD-10-CM

## 2025-04-07 PROCEDURE — 1036F TOBACCO NON-USER: CPT | Performed by: OTOLARYNGOLOGY

## 2025-04-07 PROCEDURE — G8419 CALC BMI OUT NRM PARAM NOF/U: HCPCS | Performed by: OTOLARYNGOLOGY

## 2025-04-07 PROCEDURE — 3017F COLORECTAL CA SCREEN DOC REV: CPT | Performed by: OTOLARYNGOLOGY

## 2025-04-07 PROCEDURE — 31575 DIAGNOSTIC LARYNGOSCOPY: CPT | Performed by: OTOLARYNGOLOGY

## 2025-04-07 PROCEDURE — G8427 DOCREV CUR MEDS BY ELIG CLIN: HCPCS | Performed by: OTOLARYNGOLOGY

## 2025-04-07 PROCEDURE — 99203 OFFICE O/P NEW LOW 30 MIN: CPT | Performed by: OTOLARYNGOLOGY

## 2025-04-07 RX ORDER — PANTOPRAZOLE SODIUM 40 MG/1
40 TABLET, DELAYED RELEASE ORAL
Qty: 30 TABLET | Refills: 1 | Status: SHIPPED | OUTPATIENT
Start: 2025-04-07

## 2025-04-07 ASSESSMENT — ENCOUNTER SYMPTOMS
CONSTIPATION: 0
BLOOD IN STOOL: 0
STRIDOR: 0
CHOKING: 0
VOMITING: 0
TROUBLE SWALLOWING: 1
VOICE CHANGE: 1
EYE DISCHARGE: 0
WHEEZING: 0
COLOR CHANGE: 0
DIARRHEA: 0
NAUSEA: 0
FACIAL SWELLING: 0
SINUS PRESSURE: 0
SHORTNESS OF BREATH: 0
RHINORRHEA: 0
SORE THROAT: 0
COUGH: 0
PHOTOPHOBIA: 0
EYE ITCHING: 0
BACK PAIN: 0
SINUS PAIN: 0

## 2025-04-07 NOTE — PROGRESS NOTES
[facility]  [address] , [address]  [place] [address]  P: [phone]  F: [phone]       Patient     Robin Abraham  1960    ChiefComplaint     Chief Complaint   Patient presents with    Other     Constantly burping        History of Present Illness     Robin Abraham is a pleasant 64 y.o. male who presents as a new patient for 6-month history of chronic belching, dysphagia.  Symptoms have been relatively persistent.  Had a similar issue in 2016 and did a course of omeprazole with some improvement.  No history of tobacco use.  He does drink 5-10 beers a week.  No cough.  He does admit to some throat irritation.  He has some occasional dysphonia per his wife.  No hemoptysis.  No fevers, chills, night sweats or unexpected weight loss.  Never had a swallow study.  He did try some Tums without relief.  Denies any obvious heartburn.    Past Medical History     Past Medical History:   Diagnosis Date    Brachial neuritis or radiculitis NOS 11/9/2010    Displacement of cervical intervertebral disc without myelopathy 11/9/2010    Other and unspecified hyperlipidemia 11/9/2010    Palpitations 11/9/2010    Panic disorder without agoraphobia 11/9/2010       Past Surgical History     Past Surgical History:   Procedure Laterality Date    CERVICAL SPINE SURGERY  2005    c6-7    SHOULDER SURGERY Left 12/2020    TONSILLECTOMY AND ADENOIDECTOMY         Family History     Family History   Problem Relation Age of Onset    Diabetes Mother     High Blood Pressure Father     Heart Disease Father     High Cholesterol Father     No Known Problems Sister     No Known Problems Maternal Grandmother     No Known Problems Maternal Grandfather     Other Paternal Grandfather         stomach issue    No Known Problems Sister        Social History     Social History     Socioeconomic History    Marital status:      Spouse name: Not on file    Number of children: Not on file    Years of education: Not on

## 2025-05-05 ENCOUNTER — OFFICE VISIT (OUTPATIENT)
Dept: ENT CLINIC | Age: 65
End: 2025-05-05
Payer: COMMERCIAL

## 2025-05-05 VITALS
HEART RATE: 67 BPM | RESPIRATION RATE: 16 BRPM | DIASTOLIC BLOOD PRESSURE: 77 MMHG | SYSTOLIC BLOOD PRESSURE: 130 MMHG | BODY MASS INDEX: 25.62 KG/M2 | WEIGHT: 183 LBS | HEIGHT: 71 IN | TEMPERATURE: 98.1 F

## 2025-05-05 DIAGNOSIS — J38.3 VOCAL PROCESS GRANULOMA: ICD-10-CM

## 2025-05-05 DIAGNOSIS — K21.9 LARYNGOPHARYNGEAL REFLUX (LPR): Primary | ICD-10-CM

## 2025-05-05 PROCEDURE — 1036F TOBACCO NON-USER: CPT | Performed by: OTOLARYNGOLOGY

## 2025-05-05 PROCEDURE — 3017F COLORECTAL CA SCREEN DOC REV: CPT | Performed by: OTOLARYNGOLOGY

## 2025-05-05 PROCEDURE — 31575 DIAGNOSTIC LARYNGOSCOPY: CPT | Performed by: OTOLARYNGOLOGY

## 2025-05-05 PROCEDURE — G8427 DOCREV CUR MEDS BY ELIG CLIN: HCPCS | Performed by: OTOLARYNGOLOGY

## 2025-05-05 PROCEDURE — 99213 OFFICE O/P EST LOW 20 MIN: CPT | Performed by: OTOLARYNGOLOGY

## 2025-05-05 PROCEDURE — G8419 CALC BMI OUT NRM PARAM NOF/U: HCPCS | Performed by: OTOLARYNGOLOGY

## 2025-05-05 ASSESSMENT — ENCOUNTER SYMPTOMS
SINUS PRESSURE: 0
STRIDOR: 0
CHOKING: 0
NAUSEA: 0
RHINORRHEA: 0
COUGH: 0
TROUBLE SWALLOWING: 0
EYE REDNESS: 0
VOICE CHANGE: 0
FACIAL SWELLING: 0
PHOTOPHOBIA: 0
SINUS PAIN: 0
SHORTNESS OF BREATH: 0
SORE THROAT: 0
DIARRHEA: 0
COLOR CHANGE: 0
EYE ITCHING: 0
EYE PAIN: 0

## 2025-05-05 NOTE — PROGRESS NOTES
[facility]  [address] , [address]  [place] [address]  P: [phone]  F: [phone]       Patient     Robin Abraham  1960    ChiefComplaint     Chief Complaint   Patient presents with    Follow-up     Follow up thing ok        History of Present Illness     Robin Abraham is a pleasant 64 y.o. male here for 1 month follow-up for dysphagia, vocal process granuloma, LPR.  Initiated pantoprazole at previous visit.  He has noticed significant improvement of belching and swallowing issues.  Near complete resolution of symptoms.  He reports no side effects from the medication.    Past Medical History     Past Medical History:   Diagnosis Date    Brachial neuritis or radiculitis NOS 11/9/2010    Displacement of cervical intervertebral disc without myelopathy 11/9/2010    Other and unspecified hyperlipidemia 11/9/2010    Palpitations 11/9/2010    Panic disorder without agoraphobia 11/9/2010       Past Surgical History     Past Surgical History:   Procedure Laterality Date    CERVICAL SPINE SURGERY  2005    c6-7    SHOULDER SURGERY Left 12/2020    TONSILLECTOMY AND ADENOIDECTOMY         Family History     Family History   Problem Relation Age of Onset    Diabetes Mother     High Blood Pressure Father     Heart Disease Father     High Cholesterol Father     No Known Problems Sister     No Known Problems Maternal Grandmother     No Known Problems Maternal Grandfather     Other Paternal Grandfather         stomach issue    No Known Problems Sister        Social History     Social History     Socioeconomic History    Marital status:      Spouse name: Not on file    Number of children: Not on file    Years of education: Not on file    Highest education level: Not on file   Occupational History    Not on file   Tobacco Use    Smoking status: Former     Current packs/day: 0.00     Average packs/day: 0.3 packs/day for 5.0 years (1.3 ttl pk-yrs)     Types: Cigarettes     Start date:

## 2025-05-29 DIAGNOSIS — K21.9 LARYNGOPHARYNGEAL REFLUX (LPR): ICD-10-CM

## 2025-06-02 RX ORDER — PANTOPRAZOLE SODIUM 40 MG/1
40 TABLET, DELAYED RELEASE ORAL
Qty: 30 TABLET | Refills: 1 | Status: SHIPPED | OUTPATIENT
Start: 2025-06-02

## 2025-06-12 DIAGNOSIS — K21.9 LARYNGOPHARYNGEAL REFLUX (LPR): ICD-10-CM

## 2025-06-23 RX ORDER — PANTOPRAZOLE SODIUM 40 MG/1
40 TABLET, DELAYED RELEASE ORAL
Qty: 30 TABLET | Refills: 1 | Status: SHIPPED | OUTPATIENT
Start: 2025-06-23

## 2025-09-02 RX ORDER — ATORVASTATIN CALCIUM 20 MG/1
20 TABLET, FILM COATED ORAL DAILY
Qty: 90 TABLET | Refills: 1 | Status: SHIPPED | OUTPATIENT
Start: 2025-09-02